# Patient Record
Sex: FEMALE | Race: ASIAN | NOT HISPANIC OR LATINO
[De-identification: names, ages, dates, MRNs, and addresses within clinical notes are randomized per-mention and may not be internally consistent; named-entity substitution may affect disease eponyms.]

---

## 2017-01-31 ENCOUNTER — APPOINTMENT (OUTPATIENT)
Dept: OPHTHALMOLOGY | Facility: CLINIC | Age: 67
End: 2017-01-31

## 2017-06-10 ENCOUNTER — INPATIENT (INPATIENT)
Facility: HOSPITAL | Age: 67
LOS: 5 days | Discharge: HOME | DRG: 312 | End: 2017-06-16
Attending: INTERNAL MEDICINE | Admitting: INTERNAL MEDICINE
Payer: COMMERCIAL

## 2017-06-10 VITALS
RESPIRATION RATE: 18 BRPM | DIASTOLIC BLOOD PRESSURE: 61 MMHG | OXYGEN SATURATION: 98 % | SYSTOLIC BLOOD PRESSURE: 97 MMHG | HEART RATE: 79 BPM | TEMPERATURE: 97 F

## 2017-06-10 DIAGNOSIS — R55 SYNCOPE AND COLLAPSE: ICD-10-CM

## 2017-06-10 DIAGNOSIS — I10 ESSENTIAL (PRIMARY) HYPERTENSION: ICD-10-CM

## 2017-06-10 DIAGNOSIS — E78.00 PURE HYPERCHOLESTEROLEMIA, UNSPECIFIED: ICD-10-CM

## 2017-06-10 DIAGNOSIS — I25.10 ATHEROSCLEROTIC HEART DISEASE OF NATIVE CORONARY ARTERY WITHOUT ANGINA PECTORIS: ICD-10-CM

## 2017-06-10 LAB
ALBUMIN SERPL ELPH-MCNC: 3.8 G/DL — SIGNIFICANT CHANGE UP (ref 3.3–5)
ALP SERPL-CCNC: 43 U/L — SIGNIFICANT CHANGE UP (ref 40–120)
ALT FLD-CCNC: 14 U/L RC — SIGNIFICANT CHANGE UP (ref 10–45)
ANION GAP SERPL CALC-SCNC: 14 MMOL/L — SIGNIFICANT CHANGE UP (ref 5–17)
APPEARANCE UR: CLEAR — SIGNIFICANT CHANGE UP
APTT BLD: 27.4 SEC — LOW (ref 27.5–37.4)
AST SERPL-CCNC: 15 U/L — SIGNIFICANT CHANGE UP (ref 10–40)
BASOPHILS # BLD AUTO: 0 K/UL — SIGNIFICANT CHANGE UP (ref 0–0.2)
BASOPHILS NFR BLD AUTO: 0.2 % — SIGNIFICANT CHANGE UP (ref 0–2)
BILIRUB SERPL-MCNC: 0.2 MG/DL — SIGNIFICANT CHANGE UP (ref 0.2–1.2)
BILIRUB UR-MCNC: NEGATIVE — SIGNIFICANT CHANGE UP
BLD GP AB SCN SERPL QL: NEGATIVE — SIGNIFICANT CHANGE UP
BUN SERPL-MCNC: 51 MG/DL — HIGH (ref 7–23)
CALCIUM SERPL-MCNC: 8.7 MG/DL — SIGNIFICANT CHANGE UP (ref 8.4–10.5)
CHLORIDE SERPL-SCNC: 107 MMOL/L — SIGNIFICANT CHANGE UP (ref 96–108)
CK MB CFR SERPL CALC: 1.1 NG/ML — SIGNIFICANT CHANGE UP (ref 0–3.8)
CK SERPL-CCNC: 56 U/L — SIGNIFICANT CHANGE UP (ref 25–170)
CO2 SERPL-SCNC: 22 MMOL/L — SIGNIFICANT CHANGE UP (ref 22–31)
COLOR SPEC: SIGNIFICANT CHANGE UP
CREAT SERPL-MCNC: 1.08 MG/DL — SIGNIFICANT CHANGE UP (ref 0.5–1.3)
D DIMER BLD IA.RAPID-MCNC: 308 NG/ML DDU — HIGH
DIFF PNL FLD: NEGATIVE — SIGNIFICANT CHANGE UP
EOSINOPHIL # BLD AUTO: 0.1 K/UL — SIGNIFICANT CHANGE UP (ref 0–0.5)
EOSINOPHIL NFR BLD AUTO: 0.9 % — SIGNIFICANT CHANGE UP (ref 0–6)
EPI CELLS # UR: SIGNIFICANT CHANGE UP /HPF
GLUCOSE SERPL-MCNC: 95 MG/DL — SIGNIFICANT CHANGE UP (ref 70–99)
GLUCOSE UR QL: NEGATIVE — SIGNIFICANT CHANGE UP
HCT VFR BLD CALC: 30.4 % — LOW (ref 34.5–45)
HGB BLD-MCNC: 10.2 G/DL — LOW (ref 11.5–15.5)
INR BLD: 1.1 RATIO — SIGNIFICANT CHANGE UP (ref 0.88–1.16)
KETONES UR-MCNC: NEGATIVE — SIGNIFICANT CHANGE UP
LEUKOCYTE ESTERASE UR-ACNC: NEGATIVE — SIGNIFICANT CHANGE UP
LIDOCAIN IGE QN: 88 U/L — HIGH (ref 7–60)
LYMPHOCYTES # BLD AUTO: 1.5 K/UL — SIGNIFICANT CHANGE UP (ref 1–3.3)
LYMPHOCYTES # BLD AUTO: 14.6 % — SIGNIFICANT CHANGE UP (ref 13–44)
MCHC RBC-ENTMCNC: 33.4 PG — SIGNIFICANT CHANGE UP (ref 27–34)
MCHC RBC-ENTMCNC: 33.6 GM/DL — SIGNIFICANT CHANGE UP (ref 32–36)
MCV RBC AUTO: 99.4 FL — SIGNIFICANT CHANGE UP (ref 80–100)
MONOCYTES # BLD AUTO: 0.8 K/UL — SIGNIFICANT CHANGE UP (ref 0–0.9)
MONOCYTES NFR BLD AUTO: 8 % — SIGNIFICANT CHANGE UP (ref 2–14)
NEUTROPHILS # BLD AUTO: 7.6 K/UL — HIGH (ref 1.8–7.4)
NEUTROPHILS NFR BLD AUTO: 76.3 % — SIGNIFICANT CHANGE UP (ref 43–77)
NITRITE UR-MCNC: NEGATIVE — SIGNIFICANT CHANGE UP
NT-PROBNP SERPL-SCNC: 127 PG/ML — SIGNIFICANT CHANGE UP (ref 0–300)
PH UR: 6 — SIGNIFICANT CHANGE UP (ref 5–8)
PLATELET # BLD AUTO: 200 K/UL — SIGNIFICANT CHANGE UP (ref 150–400)
POTASSIUM SERPL-MCNC: 4.4 MMOL/L — SIGNIFICANT CHANGE UP (ref 3.5–5.3)
POTASSIUM SERPL-SCNC: 4.4 MMOL/L — SIGNIFICANT CHANGE UP (ref 3.5–5.3)
PROT SERPL-MCNC: 6.3 G/DL — SIGNIFICANT CHANGE UP (ref 6–8.3)
PROT UR-MCNC: NEGATIVE — SIGNIFICANT CHANGE UP
PROTHROM AB SERPL-ACNC: 12 SEC — SIGNIFICANT CHANGE UP (ref 9.8–12.7)
RBC # BLD: 3.06 M/UL — LOW (ref 3.8–5.2)
RBC # FLD: 12.5 % — SIGNIFICANT CHANGE UP (ref 10.3–14.5)
RBC CASTS # UR COMP ASSIST: SIGNIFICANT CHANGE UP /HPF (ref 0–2)
RH IG SCN BLD-IMP: POSITIVE — SIGNIFICANT CHANGE UP
SODIUM SERPL-SCNC: 143 MMOL/L — SIGNIFICANT CHANGE UP (ref 135–145)
SP GR SPEC: >1.03 — HIGH (ref 1.01–1.02)
TROPONIN T SERPL-MCNC: <0.01 NG/ML — SIGNIFICANT CHANGE UP (ref 0–0.06)
TSH SERPL-MCNC: 0.57 UIU/ML — SIGNIFICANT CHANGE UP (ref 0.27–4.2)
UROBILINOGEN FLD QL: NEGATIVE — SIGNIFICANT CHANGE UP
WBC # BLD: 10 K/UL — SIGNIFICANT CHANGE UP (ref 3.8–10.5)
WBC # FLD AUTO: 10 K/UL — SIGNIFICANT CHANGE UP (ref 3.8–10.5)
WBC UR QL: SIGNIFICANT CHANGE UP /HPF (ref 0–5)

## 2017-06-10 PROCEDURE — 70450 CT HEAD/BRAIN W/O DYE: CPT | Mod: 26

## 2017-06-10 PROCEDURE — 71275 CT ANGIOGRAPHY CHEST: CPT | Mod: 26

## 2017-06-10 PROCEDURE — 71010: CPT | Mod: 26

## 2017-06-10 PROCEDURE — 93010 ELECTROCARDIOGRAM REPORT: CPT

## 2017-06-10 PROCEDURE — 99285 EMERGENCY DEPT VISIT HI MDM: CPT | Mod: 25

## 2017-06-10 RX ORDER — PANTOPRAZOLE SODIUM 20 MG/1
40 TABLET, DELAYED RELEASE ORAL
Qty: 0 | Refills: 0 | Status: DISCONTINUED | OUTPATIENT
Start: 2017-06-10 | End: 2017-06-14

## 2017-06-10 RX ORDER — CHOLECALCIFEROL (VITAMIN D3) 125 MCG
1 CAPSULE ORAL
Qty: 0 | Refills: 0 | COMMUNITY

## 2017-06-10 RX ORDER — SODIUM CHLORIDE 9 MG/ML
1000 INJECTION INTRAMUSCULAR; INTRAVENOUS; SUBCUTANEOUS ONCE
Qty: 0 | Refills: 0 | Status: COMPLETED | OUTPATIENT
Start: 2017-06-10 | End: 2017-06-10

## 2017-06-10 RX ORDER — HEPARIN SODIUM 5000 [USP'U]/ML
5000 INJECTION INTRAVENOUS; SUBCUTANEOUS EVERY 12 HOURS
Qty: 0 | Refills: 0 | Status: DISCONTINUED | OUTPATIENT
Start: 2017-06-10 | End: 2017-06-13

## 2017-06-10 RX ORDER — ACETAMINOPHEN 500 MG
1000 TABLET ORAL ONCE
Qty: 0 | Refills: 0 | Status: COMPLETED | OUTPATIENT
Start: 2017-06-10 | End: 2017-06-10

## 2017-06-10 RX ORDER — BUPROPION HYDROCHLORIDE 150 MG/1
200 TABLET, EXTENDED RELEASE ORAL DAILY
Qty: 0 | Refills: 0 | Status: DISCONTINUED | OUTPATIENT
Start: 2017-06-10 | End: 2017-06-16

## 2017-06-10 RX ORDER — RANOLAZINE 500 MG/1
500 TABLET, FILM COATED, EXTENDED RELEASE ORAL
Qty: 0 | Refills: 0 | Status: DISCONTINUED | OUTPATIENT
Start: 2017-06-10 | End: 2017-06-16

## 2017-06-10 RX ORDER — ISOSORBIDE MONONITRATE 60 MG/1
60 TABLET, EXTENDED RELEASE ORAL DAILY
Qty: 0 | Refills: 0 | Status: DISCONTINUED | OUTPATIENT
Start: 2017-06-10 | End: 2017-06-16

## 2017-06-10 RX ORDER — CLONAZEPAM 1 MG
0.5 TABLET ORAL
Qty: 0 | Refills: 0 | Status: DISCONTINUED | OUTPATIENT
Start: 2017-06-10 | End: 2017-06-16

## 2017-06-10 RX ORDER — PANTOPRAZOLE SODIUM 20 MG/1
80 TABLET, DELAYED RELEASE ORAL ONCE
Qty: 0 | Refills: 0 | Status: COMPLETED | OUTPATIENT
Start: 2017-06-10 | End: 2017-06-10

## 2017-06-10 RX ORDER — CHOLECALCIFEROL (VITAMIN D3) 125 MCG
1000 CAPSULE ORAL DAILY
Qty: 0 | Refills: 0 | Status: DISCONTINUED | OUTPATIENT
Start: 2017-06-10 | End: 2017-06-16

## 2017-06-10 RX ORDER — DILTIAZEM HCL 120 MG
180 CAPSULE, EXT RELEASE 24 HR ORAL DAILY
Qty: 0 | Refills: 0 | Status: DISCONTINUED | OUTPATIENT
Start: 2017-06-10 | End: 2017-06-16

## 2017-06-10 RX ORDER — BUPROPION HYDROCHLORIDE 150 MG/1
200 TABLET, EXTENDED RELEASE ORAL DAILY
Qty: 0 | Refills: 0 | Status: DISCONTINUED | OUTPATIENT
Start: 2017-06-10 | End: 2017-06-10

## 2017-06-10 RX ORDER — ASPIRIN/CALCIUM CARB/MAGNESIUM 324 MG
1 TABLET ORAL
Qty: 0 | Refills: 0 | COMMUNITY

## 2017-06-10 RX ORDER — ONDANSETRON 8 MG/1
4 TABLET, FILM COATED ORAL ONCE
Qty: 0 | Refills: 0 | Status: COMPLETED | OUTPATIENT
Start: 2017-06-10 | End: 2017-06-10

## 2017-06-10 RX ORDER — SODIUM CHLORIDE 9 MG/ML
2000 INJECTION INTRAMUSCULAR; INTRAVENOUS; SUBCUTANEOUS
Qty: 0 | Refills: 0 | Status: DISCONTINUED | OUTPATIENT
Start: 2017-06-10 | End: 2017-06-12

## 2017-06-10 RX ORDER — ASPIRIN/CALCIUM CARB/MAGNESIUM 324 MG
81 TABLET ORAL DAILY
Qty: 0 | Refills: 0 | Status: DISCONTINUED | OUTPATIENT
Start: 2017-06-10 | End: 2017-06-13

## 2017-06-10 RX ADMIN — SODIUM CHLORIDE 4000 MILLILITER(S): 9 INJECTION INTRAMUSCULAR; INTRAVENOUS; SUBCUTANEOUS at 12:57

## 2017-06-10 RX ADMIN — PANTOPRAZOLE SODIUM 80 MILLIGRAM(S): 20 TABLET, DELAYED RELEASE ORAL at 14:48

## 2017-06-10 RX ADMIN — ONDANSETRON 4 MILLIGRAM(S): 8 TABLET, FILM COATED ORAL at 12:57

## 2017-06-10 RX ADMIN — Medication 0.5 MILLIGRAM(S): at 18:29

## 2017-06-10 RX ADMIN — HEPARIN SODIUM 5000 UNIT(S): 5000 INJECTION INTRAVENOUS; SUBCUTANEOUS at 18:29

## 2017-06-10 RX ADMIN — Medication 400 MILLIGRAM(S): at 13:10

## 2017-06-10 RX ADMIN — RANOLAZINE 500 MILLIGRAM(S): 500 TABLET, FILM COATED, EXTENDED RELEASE ORAL at 18:29

## 2017-06-10 RX ADMIN — SODIUM CHLORIDE 4000 MILLILITER(S): 9 INJECTION INTRAMUSCULAR; INTRAVENOUS; SUBCUTANEOUS at 14:10

## 2017-06-10 RX ADMIN — Medication 30 MILLIGRAM(S): at 23:25

## 2017-06-10 RX ADMIN — SODIUM CHLORIDE 75 MILLILITER(S): 9 INJECTION INTRAMUSCULAR; INTRAVENOUS; SUBCUTANEOUS at 18:39

## 2017-06-10 NOTE — ED PROVIDER NOTE - GASTROINTESTINAL, MLM
Abdomen soft, non-tender, no guarding. Abdomen soft, non-tender, no guarding.  EMA: brown stool heme occult negative

## 2017-06-10 NOTE — H&P ADULT - FAMILY HISTORY
Father  Still living? Unknown  Family history of early CAD, Age at diagnosis: Age Unknown     Aunt  Still living? Unknown  Family history of diabetes mellitus (DM), Age at diagnosis: Age Unknown

## 2017-06-10 NOTE — ED PROVIDER NOTE - PROGRESS NOTE DETAILS
Spoke with Dr. Howie Kennedy is fine with any hospitalist for admission. Spoke with Dr. Howie Kennedy is fine with any hospitalist for admission. Pt still feels light headed, worse when she stands up, vs stable.  on rectal brown stool heme occult negative, cta negative for PE. WIll admit for serial CBC's and GI consult.

## 2017-06-10 NOTE — ED PROVIDER NOTE - PHYSICAL EXAMINATION
Kim:  General: No distress.  Mentation at baseline.   HEENT: WNL  Chest/Lungs: CTAB, No wheeze, No retractions, No increased work of breathing, Normal rate  Heart: S1S2 RRR, No M/R/G, Pules equal Bilaterally in upper and lower extremities distally  Abd: soft, NT/ND, No guarding, No rebound.  No hernias, no palpable masses.  Extrem: FROM in all joints, no significant edema noted, No ulcers.  Cap refil < 2sec.  Skin: No rash noted, warm dry.  Neuro:  Grossly normal.  No difficulty ambulating. No focal deficits.  Psychiatric: No evidence of delusions. No SI/HI.

## 2017-06-10 NOTE — ED PROVIDER NOTE - MEDICAL DECISION MAKING DETAILS
Non prodromal syncope in the setting of hypotension (systolic 90's). Will check ekg, cbc, trop, cxr, sepsis w/u, fluid hydration, d-dimer, orthostatics, ct head, and re-assess.

## 2017-06-10 NOTE — H&P ADULT - NSHPREVIEWOFSYSTEMS_GEN_ALL_CORE
Gen: no loss of wt or appetite  ENT: no dizziness or hearing loss  Ophth: no blurring of vision or loss of vision  Resp: No cough or sputum production  CVS: No CP or palpitations or orthopnea  GI: + mild nausea no /V/D  : no dysuria, hematuria  Endo: no polyuria or excessive sweating  Neuro: no weakness or paresthesias  Psych: No suicidal or depressive ideation  Heme: No petechiae or easy bruising  Msk: No joint pain or swelling  All other ROS negative

## 2017-06-10 NOTE — ED ADULT NURSE NOTE - OBJECTIVE STATEMENT
68 y/o F with CA, HTN, presents with 1 episode of syncope.  Patient had eaten breakfast and suddenly found herself on floor, uncertain if she hit her head. notes she has had 2 days of right occipital headache, similar to her usual headache. + nausea, no abdominal pain, no chest pain, no palpitations, no recent strenuous exercise, no new medications, no shortness of breath, no leg pain, no recent travel, no pleuritic chest pain, no blood in stool, no fever, no urinary complaints.

## 2017-06-10 NOTE — ED PROVIDER NOTE - OBJECTIVE STATEMENT
68 y/o F with CA, HTN, presents with 1 episode of syncope.  Patient had eaten breakfast and suddenly found herself on floor, uncertain if she hit her head. notes she has had 2 days of right occipital headache, similar to her usual headache. + nausea, no abdominal pain, no chest pain, no palpiations, no recent strenuous exercise, no new medications, no shortness of breath, no leg pain, no recent travel, no pleuritic chest pain, no blood in stool, no fever, no urinary complaints. 68 y/o F with CA, HTN, presents with 1 episode of syncope.  Patient had eaten breakfast and suddenly found herself on floor, uncertain if she hit her head. notes she has had 2 days of right occipital headache, similar to her usual headache. + nausea, no abdominal pain, no chest pain, no palpiations, no recent strenuous exercise, no new medications, no shortness of breath, no leg pain, no recent travel, no pleuritic chest pain, no blood in stool, no fever, no urinary complaints.    Kim:  Syncope today with headache (typical). No fever no other complaints 66 y/o F with CA, HTN, presents with 1 episode of syncope.  Patient had eaten breakfast and suddenly found herself on floor, uncertain if she hit her head. notes she has had 2 days of right occipital headache, similar to her usual headache. + nausea, no abdominal pain, no chest pain, no palpiations, no recent strenuous exercise, no new medications, no shortness of breath, no leg pain, no recent travel, no pleuritic chest pain, no blood in stool, no fever, no urinary complaints.    Kim:  Syncope today with headache (typical). No fever no other complaints    PMD: christiano wilburn

## 2017-06-10 NOTE — H&P ADULT - PROBLEM SELECTOR PLAN 2
last hemoglobin was 13 in 2015  now it is 10 with a BUN of 51 and a nl creatinine  Suspect will drop with hydration  GI Dr Fernandez to see  recent EGD/Colon with Dr Nice showed gastric ulcer  pt states was due for another EGD  Will try to schedule in house  Monitor H/H and check iron/folate/b12 levels

## 2017-06-10 NOTE — H&P ADULT - ASSESSMENT
66 y/o F with CAD, HTN was in her usual state of health when she was called by her daughter for breakfast after which she doesn’t remember anything, likely secondary to true syncope

## 2017-06-10 NOTE — ED PROVIDER NOTE - ATTENDING CONTRIBUTION TO CARE
Julio:  I have independently evaluated the patient and have documented in the appropriate sections above.  I agree with the exam and plan as noted above.

## 2017-06-10 NOTE — H&P ADULT - HISTORY OF PRESENT ILLNESS
68 y/o F with CAD, HTN was in her usual state of health when she was called by her daughter for breakfast after which she doesn’t remember anything. She states daughter found her on the floor and apparently she did not hurt herself. She denies any incontinence or seizure like activity, no chest pain or aura preceding. She does note 2 days of right occipital headache, similar to her usual headache.  Had mild nausea but no abdominal pain, no palpitations, no recent strenuous exercise, no new medications, no shortness of breath, no leg pain, no recent travel, no pleuritic chest pain, no blood in stool, no fever, no urinary complaints. Currently feels back to NL

## 2017-06-11 DIAGNOSIS — R55 SYNCOPE AND COLLAPSE: ICD-10-CM

## 2017-06-11 DIAGNOSIS — E53.8 DEFICIENCY OF OTHER SPECIFIED B GROUP VITAMINS: ICD-10-CM

## 2017-06-11 DIAGNOSIS — D64.9 ANEMIA, UNSPECIFIED: ICD-10-CM

## 2017-06-11 LAB
ANION GAP SERPL CALC-SCNC: 9 MMOL/L — SIGNIFICANT CHANGE UP (ref 5–17)
BUN SERPL-MCNC: 33 MG/DL — HIGH (ref 7–23)
CALCIUM SERPL-MCNC: 8.1 MG/DL — LOW (ref 8.4–10.5)
CHLORIDE SERPL-SCNC: 111 MMOL/L — HIGH (ref 96–108)
CK MB CFR SERPL CALC: 1 NG/ML — SIGNIFICANT CHANGE UP (ref 0–3.8)
CK MB CFR SERPL CALC: 1 NG/ML — SIGNIFICANT CHANGE UP (ref 0–3.8)
CK SERPL-CCNC: 44 U/L — SIGNIFICANT CHANGE UP (ref 25–170)
CK SERPL-CCNC: 46 U/L — SIGNIFICANT CHANGE UP (ref 25–170)
CO2 SERPL-SCNC: 23 MMOL/L — SIGNIFICANT CHANGE UP (ref 22–31)
CREAT SERPL-MCNC: 1.05 MG/DL — SIGNIFICANT CHANGE UP (ref 0.5–1.3)
FERRITIN SERPL-MCNC: 34 NG/ML — SIGNIFICANT CHANGE UP (ref 15–150)
GLUCOSE SERPL-MCNC: 98 MG/DL — SIGNIFICANT CHANGE UP (ref 70–99)
HCT VFR BLD CALC: 24.7 % — LOW (ref 34.5–45)
HCT VFR BLD CALC: 24.9 % — LOW (ref 34.5–45)
HCV AB S/CO SERPL IA: 0.16 S/CO — SIGNIFICANT CHANGE UP
HCV AB SERPL-IMP: SIGNIFICANT CHANGE UP
HGB BLD-MCNC: 8.1 G/DL — LOW (ref 11.5–15.5)
HGB BLD-MCNC: 8.4 G/DL — LOW (ref 11.5–15.5)
IRON SATN MFR SERPL: 101 UG/DL — SIGNIFICANT CHANGE UP (ref 30–160)
IRON SATN MFR SERPL: 40 % — SIGNIFICANT CHANGE UP (ref 14–50)
MCHC RBC-ENTMCNC: 32.6 PG — SIGNIFICANT CHANGE UP (ref 27–34)
MCHC RBC-ENTMCNC: 32.7 GM/DL — SIGNIFICANT CHANGE UP (ref 32–36)
MCHC RBC-ENTMCNC: 34 PG — SIGNIFICANT CHANGE UP (ref 27–34)
MCHC RBC-ENTMCNC: 34.2 GM/DL — SIGNIFICANT CHANGE UP (ref 32–36)
MCV RBC AUTO: 99.4 FL — SIGNIFICANT CHANGE UP (ref 80–100)
MCV RBC AUTO: 99.8 FL — SIGNIFICANT CHANGE UP (ref 80–100)
OB PNL STL: POSITIVE
PLATELET # BLD AUTO: 172 K/UL — SIGNIFICANT CHANGE UP (ref 150–400)
PLATELET # BLD AUTO: 185 K/UL — SIGNIFICANT CHANGE UP (ref 150–400)
POTASSIUM SERPL-MCNC: 5.1 MMOL/L — SIGNIFICANT CHANGE UP (ref 3.5–5.3)
POTASSIUM SERPL-SCNC: 5.1 MMOL/L — SIGNIFICANT CHANGE UP (ref 3.5–5.3)
RBC # BLD: 2.48 M/UL — LOW (ref 3.8–5.2)
RBC # BLD: 2.49 M/UL — LOW (ref 3.8–5.2)
RBC # FLD: 12.4 % — SIGNIFICANT CHANGE UP (ref 10.3–14.5)
RBC # FLD: 12.5 % — SIGNIFICANT CHANGE UP (ref 10.3–14.5)
SODIUM SERPL-SCNC: 143 MMOL/L — SIGNIFICANT CHANGE UP (ref 135–145)
TIBC SERPL-MCNC: 255 UG/DL — SIGNIFICANT CHANGE UP (ref 220–430)
TROPONIN T SERPL-MCNC: <0.01 NG/ML — SIGNIFICANT CHANGE UP (ref 0–0.06)
TROPONIN T SERPL-MCNC: <0.01 NG/ML — SIGNIFICANT CHANGE UP (ref 0–0.06)
UIBC SERPL-MCNC: 154 UG/DL — SIGNIFICANT CHANGE UP (ref 110–370)
VIT B12 SERPL-MCNC: 201 PG/ML — LOW (ref 243–894)
WBC # BLD: 5.4 K/UL — SIGNIFICANT CHANGE UP (ref 3.8–10.5)
WBC # BLD: 6 K/UL — SIGNIFICANT CHANGE UP (ref 3.8–10.5)
WBC # FLD AUTO: 5.4 K/UL — SIGNIFICANT CHANGE UP (ref 3.8–10.5)
WBC # FLD AUTO: 6 K/UL — SIGNIFICANT CHANGE UP (ref 3.8–10.5)

## 2017-06-11 PROCEDURE — 93010 ELECTROCARDIOGRAM REPORT: CPT

## 2017-06-11 RX ADMIN — PANTOPRAZOLE SODIUM 40 MILLIGRAM(S): 20 TABLET, DELAYED RELEASE ORAL at 07:03

## 2017-06-11 RX ADMIN — RANOLAZINE 500 MILLIGRAM(S): 500 TABLET, FILM COATED, EXTENDED RELEASE ORAL at 05:45

## 2017-06-11 RX ADMIN — HEPARIN SODIUM 5000 UNIT(S): 5000 INJECTION INTRAVENOUS; SUBCUTANEOUS at 05:46

## 2017-06-11 RX ADMIN — RANOLAZINE 500 MILLIGRAM(S): 500 TABLET, FILM COATED, EXTENDED RELEASE ORAL at 17:17

## 2017-06-11 RX ADMIN — SODIUM CHLORIDE 75 MILLILITER(S): 9 INJECTION INTRAMUSCULAR; INTRAVENOUS; SUBCUTANEOUS at 08:30

## 2017-06-11 RX ADMIN — Medication 180 MILLIGRAM(S): at 05:46

## 2017-06-11 RX ADMIN — Medication 81 MILLIGRAM(S): at 12:48

## 2017-06-11 RX ADMIN — Medication 0.5 MILLIGRAM(S): at 05:46

## 2017-06-11 RX ADMIN — Medication 30 MILLIGRAM(S): at 21:03

## 2017-06-11 RX ADMIN — HEPARIN SODIUM 5000 UNIT(S): 5000 INJECTION INTRAVENOUS; SUBCUTANEOUS at 17:17

## 2017-06-11 RX ADMIN — BUPROPION HYDROCHLORIDE 200 MILLIGRAM(S): 150 TABLET, EXTENDED RELEASE ORAL at 12:48

## 2017-06-11 RX ADMIN — Medication 0.5 MILLIGRAM(S): at 17:18

## 2017-06-11 RX ADMIN — Medication 1000 UNIT(S): at 12:48

## 2017-06-11 NOTE — CONSULT NOTE ADULT - ATTENDING COMMENTS
CARDIOLOGY ATTENDING    Agree with above. Admitted with syncope. EKG normal. Recommend echo r/o structural heart disease. If echo normal recommend outpatient event monitor. F/u with [facility] cardiologist Luke Augustine after discharge.

## 2017-06-11 NOTE — CONSULT NOTE ADULT - PROBLEM SELECTOR RECOMMENDATION 9
- Cardiology consult pending  - ? Echocardiogram per cards / trend cardiac enzymes   - Management per primary

## 2017-06-11 NOTE — CONSULT NOTE ADULT - SUBJECTIVE AND OBJECTIVE BOX
Chief Complaint:  Patient is a 67y old  Female who presents with a chief complaint of anemia/ Hx of occult positive anemia     HPI:  68 y/o F with CAD, HTN was in her usual state of health when she was called by her daughter for breakfast after which she doesn’t remember anything. She states daughter found her on the floor and apparently she did not hurt herself. She denies any incontinence or seizure like activity, no chest pain or aura preceding. She does note 2 days of right occipital headache, similar to her usual headache.  Had mild nausea but no abdominal pain, no palpitations, no recent strenuous exercise, no new medications, no shortness of breath, no leg pain, no recent travel, no pleuritic chest pain, no blood in stool, no fever, no urinary complaints. Currently feels back to NL (10 Jonny 2017 17:24)    Patient now being seen for decreasing h/h, w/ a history of occult positive anemia (about three to four years ago.) She endorses a history of gastric ulcers (which were found on recent egd within the past year w/ Dr. Nice, she has had a colonoscopy w/in the last year as well; negative). No current melena or hematochezia, no abdominal complaints at present. Tolerating a regular diet. Uses aspirin daily at home.       PAST MEDICAL & SURGICAL HISTORY:  CAD (coronary artery disease)  Hypercholesterolemia  Hypertension  No significant past surgical history      Previous Diagnostic Testing:    EGD: W/in the past year w/ Dr. Nice -- Gastric Ulcers     Colonoscopy: W/in the past year w. Dr. Nice -- negative     Home Medications:   aspirin enteric coated 81milliGRAM(s) Oral daily  buPROPion SR 200milliGRAM(s) Oral daily  isosorbide   mononitrate ER Tablet (IMDUR) 60milliGRAM(s) Oral daily  ranolazine 500milliGRAM(s) Oral two times a day  diltiazem   CD 180milliGRAM(s) Oral daily  clonazePAM Tablet 0.5milliGRAM(s) Oral two times a day  PARoxetine 30milliGRAM(s) Oral daily  cholecalciferol 1000Unit(s) Oral daily  sodium chloride 0.9%. 2000milliLiter(s) IV Continuous <Continuous>  heparin  Injectable 5000Unit(s) SubCutaneous every 12 hours  pantoprazole    Tablet 40milliGRAM(s) Oral before breakfast        FAMILY HISTORY:  Family history of diabetes mellitus (DM) (Aunt)  Family history of early CAD (Father)      Social History: Marital Status: Unknown Lives With: Unknown Substance Abuse: None Smoker: Unknown     Allergies    latex (Rash)  No Known Drug Allergies  shellfish (Hives)    Review of Systems:    General:  No wt loss, fevers, chills, night sweats,fatigue,   Eyes:  Good vision, no reported pain  ENT:  No sore throat, pain, runny nose, dysphagia  CV:  No pain, palpitatioins, hypo/hypertension  Resp:  No dyspnea, cough, tachypnea, wheezing  :  No pain, bleeding, incontinence, nocturia  Muscle:  No pain, weakness  Neuro:  No weakness, tingling, memory problems  Psych:  No fatigue, insomnia, mood problems, depression  Endocrine:  No polyuria, polydypsia, cold/heat intolerance  Heme:  No petechiae, ecchymosis, easy bruisability  Skin:  No rash, tattoos, scars, edema    Physical Exam:    Vital Signs:  Vital Signs Last 24 Hrs  T(C): 36.6, Max: 36.9 (06-10 @ 16:59)  T(F): 97.9, Max: 98.4 (06-10 @ 16:59)  HR: 74 (70 - 79)  BP: 101/63 (97/61 - 121/66)  BP(mean): --  RR: 18 (18 - 18)  SpO2: 97% (97% - 100%)  Daily Height in cm: 149.86 (10 Jonny 2017 18:10)    Daily Weight in k.2 (2017 07:13)    General:  Appears stated age, well-groomed, well-nourished, no distress  HEENT:  NC/AT,  conjunctivae clear and pink, no thyromegaly, nodules, adenopathy, no JVD  Chest:  Full & symmetric excursion, no increased effort, breath sounds clear  Cardiovascular:  Regular rhythm, S1, S2, no murmur/rub/S3/S4, no abdominal bruit, no edema  Abdomen:  Soft, non-tender, non-distended, normoactive bowel sounds,  no masses ,no hepatosplenomeagaly, no signs of chronic liver disease  Extremities:  no cyanosis,clubbing or edema  Skin:  No rash/erythema/ecchymoses/petechiae/wounds/abscess/warm/dry  Neuro/Psych:  Alert, oriented, no asterixis, no tremor, no encephalopathy      Imaging: -----      Laboratory:      143  |  111<H>  |  33<H>  ----------------------------<  98  5.1   |  23  |  1.05    Ca    8.1<L>      2017 04:02    TPro  6.3  /  Alb  3.8  /  TBili  0.2  /  DBili  x   /  AST  15  /  ALT  14  /  AlkPhos  43  06-10                          8.1    5.4   )-----------( 172      ( 2017 08:26 )             24.9         LIVER FUNCTIONS - ( 10 Jonny 2017 12:51 )  Alb: 3.8 g/dL / Pro: 6.3 g/dL / ALK PHOS: 43 U/L / ALT: 14 U/L RC / AST: 15 U/L / GGT: x           PT/INR - ( 10 Jonny 2017 12:51 )   PT: 12.0 sec;   INR: 1.10 ratio         PTT - ( 10 Jonny 2017 12:51 )  PTT:27.4 sec  Urinalysis Basic - ( 10 Jonny 2017 15:20 )    Color: PL Yellow / Appearance: Clear / SG: >1.030 / pH: x  Gluc: x / Ketone: Negative  / Bili: Negative / Urobili: Negative   Blood: x / Protein: Negative / Nitrite: Negative   Leuk Esterase: Negative / RBC: 0-2 /HPF / WBC 0-2 /HPF   Sq Epi: x / Non Sq Epi: OCC /HPF / Bacteria: x      Amylase Serum--      Lipase serum88       Ammonia--

## 2017-06-11 NOTE — CONSULT NOTE ADULT - ASSESSMENT
68 y/o F with CAD, HTN, hx of gastric ulcers and occult positive anemia, admitted for syncope workup; now being evaluated for decreasing h/h in the absence of melena or hematochezia

## 2017-06-11 NOTE — CONSULT NOTE ADULT - SUBJECTIVE AND OBJECTIVE BOX
HISTORY OF PRESENT ILLNESS:    68 y/o F with CAD, HTN, HLD  denies any history of arrhythmias CHF or MI  Farther had MI at age 66   P/W syncopal episode unwitnessed  found by family member.  She reports she does not remember event did not experience any lightheadedness or aura preceding event  & denies any head trauma. She denies any incontinence or seizure like activity, no chest pain or aura preceding. She does note 2 days of right occipital headache, similar to her usual headache.  Had mild nausea but no abdominal pain, no palpitations, no recent strenuous exercise, no new medications, no shortness of breath, no leg pain, no recent travel, no pleuritic chest pain, no blood in stool, no fever, no urinary complaints.   Reports she had stress test for per op many years ago denies hx CATH   Cardiologist is Dr Augustine at [facility]      PAST MEDICAL & SURGICAL HISTORY:  CAD (coronary artery disease)  Hypercholesterolemia  Hypertension  Sx hx includes c spine fusion C3-4     	    MEDICATIONS:  aspirin enteric coated 81milliGRAM(s) Oral daily  isosorbide   mononitrate ER Tablet (IMDUR) 60milliGRAM(s) Oral daily  ranolazine 500milliGRAM(s) Oral two times a day  diltiazem   CD 180milliGRAM(s) Oral daily  heparin  Injectable 5000Unit(s) SubCutaneous every 12 hours        buPROPion SR 200milliGRAM(s) Oral daily  clonazePAM Tablet 0.5milliGRAM(s) Oral two times a day  PARoxetine 30milliGRAM(s) Oral daily    pantoprazole    Tablet 40milliGRAM(s) Oral before breakfast      cholecalciferol 1000Unit(s) Oral daily  sodium chloride 0.9%. 2000milliLiter(s) IV Continuous <Continuous>      Allergies    latex (Rash)  No Known Drug Allergies  shellfish (Hives)    Intolerances        FAMILY HISTORY:  Family history of diabetes mellitus (DM) (Aunt)  Family history of early CAD (Father)      SOCIAL HISTORY:    [x ] Non-smoker  [ ] Smoker  [ ] Alcohol none      REVIEW OF SYSTEMS:        CONSTITUTIONAL: No fever, weight loss, or fatigue  EYES: No eye pain, visual disturbances, or discharge  ENMT:  No difficulty hearing, tinnitus, vertigo; No sinus or throat pain  NECK: No pain or stiffness  RESPIRATORY: No cough, wheezing, chills or hemoptysis; No Shortness of Breath  CARDIOVASCULAR: No chest pain, palpitations,  + passing out PTA  dizziness, or leg swelling  GASTROINTESTINAL: No abdominal or epigastric pain. +  nausea PTA, vomiting, or hematemesis; No diarrhea or constipation. No melena or hematochezia.  GENITOURINARY: No dysuria, frequency, hematuria, or incontinence  NEUROLOGICAL: + headaches [2 days of right occipital headache, similar to her usual headache] PTA  memory loss, loss of strength, numbness, or tremors  SKIN: No itching, burning, rashes, or lesions   LYMPH Nodes: No enlarged glands  ENDOCRINE: No heat or cold intolerance; No hair loss  MUSCULOSKELETAL: No joint pain or swelling; No muscle, back, or extremity pain  PSYCHIATRIC: No depression, anxiety, mood swings, or difficulty sleeping  HEME/LYMPH: No easy bruising, or bleeding gums  ALLERY AND IMMUNOLOGIC: No hives or eczema	    [ ] All others negative	  [ ] Unable to obtain    PHYSICAL EXAM:  T(C): 36.6, Max: 36.9 (06-10 @ 16:59)  HR: 74 (70 - 79)  BP: 101/63 (97/61 - 121/66)  RR: 18 (18 - 18)  SpO2: 97% (97% - 100%)  Wt(kg): --  I&O's Summary  I & Os for 24h ending 11 Jun 2017 07:00  =============================================  IN: 1620 ml / OUT: 0 ml / NET: 1620 ml    I & Os for current day (as of 11 Jun 2017 10:56)  =============================================  IN: 0 ml / OUT: 600 ml / NET: -600 ml      Appearance: Normal	no signs of acute distress   HEENT:   Normal oral mucosa, PERRL, EOMI	  Lymphatic: No lymphadenopathy  Cardiovascular: Normal S1 S2 RRR, No JVD, No murmurs, No edema  Respiratory: Lungs clear to auscultation	  Psychiatry: A & O x 3, Mood & affect appropriate  Gastrointestinal:  Soft, Non-tender, + BS	  Skin: No rashes, No ecchymoses, No cyanosis	  Neurologic: Non-focal  Extremities: AROM WFL's, No clubbing, cyanosis or edema  Vascular: Peripheral pulses palpable 2+ bilaterally    TELEMETRY: NSR   	    ECG:  	NSR     RADIOLOGY:  CXR     No focal consolidations.  No pleural effusion or pneumothorax.  Unremarkable cardiomediastinal silhouette.  Intact visualized osseous structures.    CT head  unremarkable noted C3-4 spinal fusion     CTPA   no PE  small hiatal hernia     OTHER: 	  	  LABS:  CE neg x 2  D Dimer 810  UA negative	 	    CARDIAC MARKERS:                                  8.1    5.4   )-----------( 172      ( 11 Jun 2017 08:26 )             24.9       06-11    143  |  111<H>  |  33<H>  ----------------------------<  98  5.1   |  23  |  1.05    Ca    8.1<L>      11 Jun 2017 04:02    TPro  6.3  /  Alb  3.8  /  TBili  0.2  /  DBili  x   /  AST  15  /  ALT  14  /  AlkPhos  43  06-10      proBNP: Serum Pro-Brain Natriuretic Peptide: 127 pg/mL (06-10 @ 12:51)      Lipid Profile:     HgA1c:     TSH: Thyroid Stimulating Hormone, Serum: 0.57 uIU/mL (06-10 @ 17:26)    Assessment and Plan:     68 y/o F with CAD, HTN, HLD  denies any history of arrhythmias CHF or MI  Farther had MI at age 66   P/W syncopal episode unwitnessed  found by family member.  She reports she does not remember event did not experience any lightheadedness or aura preceding event  & denies any head trauma. She denies any incontinence or seizure like activity, no chest pain or aura preceding. She does report 2 days of right occipital headache, similar to her usual headache.  Had mild nausea but no abdominal pain, no palpitations, no recent strenuous exercise, no new medications, no shortness of breath, no leg pain, no recent travel, no pleuritic chest pain, no blood in stool, no fever, no urinary complaints.   Reports she had stress test for per op many years ago denies hx CATH  Would contact Dr Augustine to verify cardiac history [[facility]]  CE neg x 2 / TELE w/ NSR / Elevated D Dimer PE r/o by CTPA / negative Orthostatics /CT head unremarkable   check ECHO   c/w TELE monitoring   c/w current medications aspirin enteric coated 81,IMDUR 60, ranolazine 500 Oral two times a day, diltiazem       Anemia       F/U GI recommendations possible EGD on Tuesday  transfuse prn     Further recommendations based on above

## 2017-06-11 NOTE — PROGRESS NOTE ADULT - SUBJECTIVE AND OBJECTIVE BOX
Patient is a 67y old  Female who presents with a chief complaint of     SUBJECTIVE / OVERNIGHT EVENTS: No nausea, vomiting or diarrhea, no fever or chills, no dizziness or chest pain, no dysuria or hematuria, no jt pain or swelling    MEDICATIONS  (STANDING):  aspirin enteric coated 81milliGRAM(s) Oral daily  buPROPion SR 200milliGRAM(s) Oral daily  isosorbide   mononitrate ER Tablet (IMDUR) 60milliGRAM(s) Oral daily  ranolazine 500milliGRAM(s) Oral two times a day  diltiazem   CD 180milliGRAM(s) Oral daily  clonazePAM Tablet 0.5milliGRAM(s) Oral two times a day  PARoxetine 30milliGRAM(s) Oral daily  cholecalciferol 1000Unit(s) Oral daily  sodium chloride 0.9%. 2000milliLiter(s) IV Continuous <Continuous>  heparin  Injectable 5000Unit(s) SubCutaneous every 12 hours  pantoprazole    Tablet 40milliGRAM(s) Oral before breakfast    MEDICATIONS  (PRN):      Vital Signs Last 24 Hrs  T(C): 36.8, Max: 36.9 (06-10 @ 16:59)  HR: 74 (70 - 76)  BP: 101/63 (101/63 - 121/66)  RR: 17 (17 - 18)  SpO2: 98% (97% - 100%)  Wt(kg): --  CAPILLARY BLOOD GLUCOSE    I&O's Summary  I & Os for 24h ending 11 Jun 2017 07:00  =============================================  IN: 1620 ml / OUT: 0 ml / NET: 1620 ml    I & Os for current day (as of 11 Jun 2017 13:29)  =============================================  IN: 300 ml / OUT: 600 ml / NET: -300 ml      PHYSICAL EXAM:  GENERAL: NAD, well-developed  HEAD:  Atraumatic, Normocephalic  EYES: EOMI, PERRLA, conjunctiva and sclera clear  NECK: Supple, No JVD  CHEST/LUNG: Clear to auscultation bilaterally; No wheeze  HEART: Regular rate and rhythm; No murmurs, rubs, or gallops  ABDOMEN: Soft, Nontender, Nondistended; Bowel sounds present  EXTREMITIES:  2+ Peripheral Pulses, No clubbing, cyanosis, or edema  PSYCH: AAOx3  NEUROLOGY: non-focal  SKIN: No rashes or lesions    LABS:                        8.1    5.4   )-----------( 172      ( 11 Jun 2017 08:26 )             24.9     06-11    143  |  111<H>  |  33<H>  ----------------------------<  98  5.1   |  23  |  1.05    Ca    8.1<L>      11 Jun 2017 04:02    TPro  6.3  /  Alb  3.8  /  TBili  0.2  /  DBili  x   /  AST  15  /  ALT  14  /  AlkPhos  43  06-10    PT/INR - ( 10 Jonny 2017 12:51 )   PT: 12.0 sec;   INR: 1.10 ratio         PTT - ( 10 Jonny 2017 12:51 )  PTT:27.4 sec  CARDIAC MARKERS ( 11 Jun 2017 11:33 )  x     / <0.01 ng/mL / 46 U/L / x     / 1.0 ng/mL  CARDIAC MARKERS ( 11 Jun 2017 04:02 )  x     / <0.01 ng/mL / 44 U/L / x     / 1.0 ng/mL  CARDIAC MARKERS ( 10 Jonny 2017 12:51 )  x     / <0.01 ng/mL / 56 U/L / x     / 1.1 ng/mL      Urinalysis Basic - ( 10 Jonny 2017 15:20 )    Color: PL Yellow / Appearance: Clear / SG: >1.030 / pH: x  Gluc: x / Ketone: Negative  / Bili: Negative / Urobili: Negative   Blood: x / Protein: Negative / Nitrite: Negative   Leuk Esterase: Negative / RBC: 0-2 /HPF / WBC 0-2 /HPF   Sq Epi: x / Non Sq Epi: OCC /HPF / Bacteria: x          Consultant(s) Notes Reviewed:      Care Discussed with Consultants/Other Providers:

## 2017-06-11 NOTE — CONSULT NOTE ADULT - PROBLEM SELECTOR RECOMMENDATION 2
- Monitor h/h and cbc, transfuse prn  - Check stool occult blood / iron studies   - PPI 40 mg po qd  - Diet as tolerated   - May need repeat egd if occult positive  - To follow - Monitor h/h and cbc, transfuse prn  - Check stool occult blood / iron studies   - PPI 40 mg po qd  - Diet as tolerated   - EGD tentatively booked for Tuesday if cardiac workup negative - medical clearance / cardiac clearance pending   - To follow

## 2017-06-12 LAB
ANION GAP SERPL CALC-SCNC: 11 MMOL/L — SIGNIFICANT CHANGE UP (ref 5–17)
APTT BLD: 32.5 SEC — SIGNIFICANT CHANGE UP (ref 27.5–37.4)
BUN SERPL-MCNC: 16 MG/DL — SIGNIFICANT CHANGE UP (ref 7–23)
CALCIUM SERPL-MCNC: 8.6 MG/DL — SIGNIFICANT CHANGE UP (ref 8.4–10.5)
CHLORIDE SERPL-SCNC: 108 MMOL/L — SIGNIFICANT CHANGE UP (ref 96–108)
CO2 SERPL-SCNC: 26 MMOL/L — SIGNIFICANT CHANGE UP (ref 22–31)
CREAT SERPL-MCNC: 1.16 MG/DL — SIGNIFICANT CHANGE UP (ref 0.5–1.3)
CULTURE RESULTS: SIGNIFICANT CHANGE UP
FERRITIN SERPL-MCNC: 30 NG/ML — SIGNIFICANT CHANGE UP (ref 15–150)
GLUCOSE SERPL-MCNC: 102 MG/DL — HIGH (ref 70–99)
HCT VFR BLD CALC: 24.6 % — LOW (ref 34.5–45)
HGB BLD-MCNC: 8.4 G/DL — LOW (ref 11.5–15.5)
INR BLD: 1.02 RATIO — SIGNIFICANT CHANGE UP (ref 0.88–1.16)
IRON SATN MFR SERPL: 26 % — SIGNIFICANT CHANGE UP (ref 14–50)
IRON SATN MFR SERPL: 69 UG/DL — SIGNIFICANT CHANGE UP (ref 30–160)
MCHC RBC-ENTMCNC: 34 PG — SIGNIFICANT CHANGE UP (ref 27–34)
MCHC RBC-ENTMCNC: 34.2 GM/DL — SIGNIFICANT CHANGE UP (ref 32–36)
MCV RBC AUTO: 99.2 FL — SIGNIFICANT CHANGE UP (ref 80–100)
PLATELET # BLD AUTO: 198 K/UL — SIGNIFICANT CHANGE UP (ref 150–400)
POTASSIUM SERPL-MCNC: 4.1 MMOL/L — SIGNIFICANT CHANGE UP (ref 3.5–5.3)
POTASSIUM SERPL-SCNC: 4.1 MMOL/L — SIGNIFICANT CHANGE UP (ref 3.5–5.3)
PROTHROM AB SERPL-ACNC: 11 SEC — SIGNIFICANT CHANGE UP (ref 9.8–12.7)
RBC # BLD: 2.48 M/UL — LOW (ref 3.8–5.2)
RBC # FLD: 12.4 % — SIGNIFICANT CHANGE UP (ref 10.3–14.5)
SODIUM SERPL-SCNC: 145 MMOL/L — SIGNIFICANT CHANGE UP (ref 135–145)
SPECIMEN SOURCE: SIGNIFICANT CHANGE UP
TIBC SERPL-MCNC: 268 UG/DL — SIGNIFICANT CHANGE UP (ref 220–430)
UIBC SERPL-MCNC: 199 UG/DL — SIGNIFICANT CHANGE UP (ref 110–370)
WBC # BLD: 4.4 K/UL — SIGNIFICANT CHANGE UP (ref 3.8–10.5)
WBC # FLD AUTO: 4.4 K/UL — SIGNIFICANT CHANGE UP (ref 3.8–10.5)

## 2017-06-12 PROCEDURE — 93306 TTE W/DOPPLER COMPLETE: CPT | Mod: 26

## 2017-06-12 RX ORDER — PREGABALIN 225 MG/1
1000 CAPSULE ORAL DAILY
Qty: 0 | Refills: 0 | Status: COMPLETED | OUTPATIENT
Start: 2017-06-12 | End: 2017-06-14

## 2017-06-12 RX ADMIN — ISOSORBIDE MONONITRATE 60 MILLIGRAM(S): 60 TABLET, EXTENDED RELEASE ORAL at 13:18

## 2017-06-12 RX ADMIN — PANTOPRAZOLE SODIUM 40 MILLIGRAM(S): 20 TABLET, DELAYED RELEASE ORAL at 06:49

## 2017-06-12 RX ADMIN — Medication 180 MILLIGRAM(S): at 06:48

## 2017-06-12 RX ADMIN — Medication 81 MILLIGRAM(S): at 13:18

## 2017-06-12 RX ADMIN — BUPROPION HYDROCHLORIDE 200 MILLIGRAM(S): 150 TABLET, EXTENDED RELEASE ORAL at 13:19

## 2017-06-12 RX ADMIN — RANOLAZINE 500 MILLIGRAM(S): 500 TABLET, FILM COATED, EXTENDED RELEASE ORAL at 06:49

## 2017-06-12 RX ADMIN — Medication 0.5 MILLIGRAM(S): at 06:48

## 2017-06-12 RX ADMIN — RANOLAZINE 500 MILLIGRAM(S): 500 TABLET, FILM COATED, EXTENDED RELEASE ORAL at 17:38

## 2017-06-12 RX ADMIN — HEPARIN SODIUM 5000 UNIT(S): 5000 INJECTION INTRAVENOUS; SUBCUTANEOUS at 06:48

## 2017-06-12 RX ADMIN — HEPARIN SODIUM 5000 UNIT(S): 5000 INJECTION INTRAVENOUS; SUBCUTANEOUS at 17:38

## 2017-06-12 RX ADMIN — Medication 0.5 MILLIGRAM(S): at 17:44

## 2017-06-12 RX ADMIN — Medication 30 MILLIGRAM(S): at 13:18

## 2017-06-12 RX ADMIN — Medication 1000 UNIT(S): at 13:18

## 2017-06-12 RX ADMIN — PREGABALIN 1000 MICROGRAM(S): 225 CAPSULE ORAL at 16:25

## 2017-06-12 NOTE — PROGRESS NOTE ADULT - SUBJECTIVE AND OBJECTIVE BOX
INTERVAL HPI/OVERNIGHT EVENTS:    no GI complaints    MEDICATIONS  (STANDING):  aspirin enteric coated 81milliGRAM(s) Oral daily  buPROPion SR 200milliGRAM(s) Oral daily  isosorbide   mononitrate ER Tablet (IMDUR) 60milliGRAM(s) Oral daily  ranolazine 500milliGRAM(s) Oral two times a day  diltiazem   CD 180milliGRAM(s) Oral daily  clonazePAM Tablet 0.5milliGRAM(s) Oral two times a day  PARoxetine 30milliGRAM(s) Oral daily  cholecalciferol 1000Unit(s) Oral daily  sodium chloride 0.9%. 2000milliLiter(s) IV Continuous <Continuous>  heparin  Injectable 5000Unit(s) SubCutaneous every 12 hours  pantoprazole    Tablet 40milliGRAM(s) Oral before breakfast  cyanocobalamin Injectable 1000MICROGram(s) IntraMuscular daily    MEDICATIONS  (PRN):      Allergies    latex (Rash)  No Known Drug Allergies  shellfish (Hives)    Intolerances        ROS:   General:  No wt loss, fevers, chills, night sweats, fatigue,   Eyes:  Good vision, no reported pain  ENT:  No sore throat, pain, runny nose, dysphagia  CV:  No pain, palpitations, hypo/hypertension  Resp:  No dyspnea, cough, tachypnea, wheezing  GI:  No pain, No nausea, No vomiting, No diarrhea, No constipation, No weight loss, No fever, No pruritis, No rectal bleeding, No tarry stools, No dysphagia,  :  No pain, bleeding, incontinence, nocturia  Muscle:  No pain, weakness  Neuro:  No weakness, tingling, memory problems  Psych:  No fatigue, insomnia, mood problems, depression  Endocrine:  No polyuria, polydipsia, cold/heat intolerance  Heme:  No petechiae, ecchymosis, easy bruisability  Skin:  No rash, tattoos, scars, edema      PHYSICAL EXAM:   Vital Signs:  Vital Signs Last 24 Hrs  T(C): 36.7, Max: 36.8 (-12 @ 04:26)  T(F): 98, Max: 98.2 (-12 @ 04:26)  HR: 70 (70 - 82)  BP: 111/70 (103/63 - 111/70)  BP(mean): --  RR: 17 (17 - 18)  SpO2: 100% (97% - 100%)  Daily     Daily Weight in k (2017 04:26)    GENERAL:  Appears stated age, well-groomed, well-nourished, no distress  HEENT:  NC/AT,  conjunctivae clear and pink, no thyromegaly, nodules, adenopathy, no JVD, sclera -anicteric  CHEST:  Full & symmetric excursion, no increased effort, breath sounds clear  HEART:  Regular rhythm, S1, S2, no murmur/rub/S3/S4, no abdominal bruit, no edema  ABDOMEN:  Soft, non-tender, non-distended, normoactive bowel sounds,  no masses ,no hepato-splenomegaly, no signs of chronic liver disease  EXTEREMITIES:  no cyanosis,clubbing or edema  SKIN:  No rash/erythema/ecchymoses/petechiae/wounds/abscess/warm/dry  NEURO:  Alert, oriented, no asterixis, no tremor, no encephalopathy      LABS:                        8.4    4.4   )-----------( 198      ( 2017 07:13 )             24.6     06-12    145  |  108  |  16  ----------------------------<  102<H>  4.1   |  26  |  1.16    Ca    8.6      2017 07:13      PT/INR - ( 2017 07:13 )   PT: 11.0 sec;   INR: 1.02 ratio         PTT - ( 2017 07:13 )  PTT:32.5 sec      RADIOLOGY & ADDITIONAL TESTS:

## 2017-06-12 NOTE — PROGRESS NOTE ADULT - SUBJECTIVE AND OBJECTIVE BOX
Subjective: pt seen and examined , no chest pain , ROS -.  	  MEDICATIONS:  MEDICATIONS  (STANDING):  aspirin enteric coated 81milliGRAM(s) Oral daily  buPROPion SR 200milliGRAM(s) Oral daily  isosorbide   mononitrate ER Tablet (IMDUR) 60milliGRAM(s) Oral daily  ranolazine 500milliGRAM(s) Oral two times a day  diltiazem   CD 180milliGRAM(s) Oral daily  clonazePAM Tablet 0.5milliGRAM(s) Oral two times a day  PARoxetine 30milliGRAM(s) Oral daily  cholecalciferol 1000Unit(s) Oral daily  sodium chloride 0.9%. 2000milliLiter(s) IV Continuous <Continuous>  heparin  Injectable 5000Unit(s) SubCutaneous every 12 hours  pantoprazole    Tablet 40milliGRAM(s) Oral before breakfast      PHYSICAL EXAM:  T(C): 36.8, Max: 36.8 (06-11 @ 12:18)  HR: 74 (74 - 82)  BP: 111/70 (98/50 - 111/70)  RR: 18 (17 - 18)  SpO2: 97% (97% - 98%)  Wt(kg): --  I&O's Summary  I & Os for 24h ending 11 Jun 2017 07:00  =============================================  IN: 1620 ml / OUT: 0 ml / NET: 1620 ml    I & Os for current day (as of 12 Jun 2017 05:27)  =============================================  IN: 1940 ml / OUT: 600 ml / NET: 1340 ml        Appearance: Normal	  HEENT:   Normal oral mucosa, PERRL, EOMI	  Lymphatic: No lymphadenopathy , no edema  Cardiovascular: Normal S1 S2, No JVD, No murmurs , Peripheral pulses palpable 2+ bilaterally  Respiratory: Lungs clear to auscultation, normal effort 	  Gastrointestinal:  Soft, Non-tender, + BS	  Skin: No rashes, No ecchymoses, No cyanosis, warm to touch  Musculoskeletal: Normal range of motion, normal strength  Psychiatry:  Mood & affect appropriate    TELEMETRY: 	  NSR  ECG:  	  RADIOLOGY:   DIAGNOSTIC TESTING:  [ ] Echocardiogram:   [ ]  Catheterization:  [ ] Stress Test:    OTHER: 	    LABS:	 	    CARDIAC MARKERS:  CARDIAC MARKERS ( 11 Jun 2017 11:33 )  x     / <0.01 ng/mL / 46 U/L / x     / 1.0 ng/mL  CARDIAC MARKERS ( 11 Jun 2017 04:02 )  x     / <0.01 ng/mL / 44 U/L / x     / 1.0 ng/mL  CARDIAC MARKERS ( 10 Jonny 2017 12:51 )  x     / <0.01 ng/mL / 56 U/L / x     / 1.1 ng/mL                                8.1    5.4   )-----------( 172      ( 11 Jun 2017 08:26 )             24.9     06-11    143  |  111<H>  |  33<H>  ----------------------------<  98  5.1   |  23  |  1.05    Ca    8.1<L>      11 Jun 2017 04:02    TPro  6.3  /  Alb  3.8  /  TBili  0.2  /  DBili  x   /  AST  15  /  ALT  14  /  AlkPhos  43  06-10    proBNP:   Lipid Profile:   HgA1c:   TSH:     ASSESSMENT/PLAN: 	67y Female hx of HTN,CAD, Chol, spinal sx , now syncope, LOC    -contact primary Cardiologist to obtain outpatient records  -cont tele  -cont trend h/h  -echo pending , evaluate structural heart.   -cont Cardizem , isosorbide, ASA, Ranexa   cont GI / DVT prophylaxis.  cont keep K>4, mag >2.0   IV hydration, trend creatine   GI follow up   D/W  D/W Dr Rahman

## 2017-06-12 NOTE — PROGRESS NOTE ADULT - SUBJECTIVE AND OBJECTIVE BOX
Patient is a 67y old  Female who presents with a chief complaint of syncope    SUBJECTIVE / OVERNIGHT EVENTS: No nausea, vomiting or diarrhea, no fever or chills, no dizziness or chest pain, no dysuria or hematuria, no jt pain or swelling    No events overnight    MEDICATIONS  (STANDING):  aspirin enteric coated 81milliGRAM(s) Oral daily  buPROPion SR 200milliGRAM(s) Oral daily  isosorbide   mononitrate ER Tablet (IMDUR) 60milliGRAM(s) Oral daily  ranolazine 500milliGRAM(s) Oral two times a day  diltiazem   CD 180milliGRAM(s) Oral daily  clonazePAM Tablet 0.5milliGRAM(s) Oral two times a day  PARoxetine 30milliGRAM(s) Oral daily  cholecalciferol 1000Unit(s) Oral daily  sodium chloride 0.9%. 2000milliLiter(s) IV Continuous <Continuous>  heparin  Injectable 5000Unit(s) SubCutaneous every 12 hours  pantoprazole    Tablet 40milliGRAM(s) Oral before breakfast    MEDICATIONS  (PRN):      Vital Signs Last 24 Hrs  T(C): 36.8, Max: 36.8 (06-12 @ 04:26)  HR: 74 (74 - 82)  BP: 111/70 (98/50 - 111/70)  RR: 18 (18 - 18)  SpO2: 97% (97% - 98%)  Wt(kg): --  CAPILLARY BLOOD GLUCOSE    I&O's Summary  I & Os for 24h ending 12 Jun 2017 07:00  =============================================  IN: 1940 ml / OUT: 600 ml / NET: 1340 ml    I & Os for current day (as of 12 Jun 2017 12:31)  =============================================  IN: 360 ml / OUT: 0 ml / NET: 360 ml      PHYSICAL EXAM:  GENERAL: NAD, well-developed  HEAD:  Atraumatic, Normocephalic  EYES: EOMI, PERRLA, conjunctiva and sclera clear  NECK: Supple, No JVD  CHEST/LUNG: Clear to auscultation bilaterally; No wheeze  HEART: Regular rate and rhythm; No murmurs, rubs, or gallops  ABDOMEN: Soft, Nontender, Nondistended; Bowel sounds present  EXTREMITIES:  2+ Peripheral Pulses, No clubbing, cyanosis, or edema  PSYCH: AAOx3  NEUROLOGY: non-focal  SKIN: No rashes or lesions    LABS:                        8.4    4.4   )-----------( 198      ( 12 Jun 2017 07:13 )             24.6     06-12    145  |  108  |  16  ----------------------------<  102<H>  4.1   |  26  |  1.16    Ca    8.6      12 Jun 2017 07:13    TPro  6.3  /  Alb  3.8  /  TBili  0.2  /  DBili  x   /  AST  15  /  ALT  14  /  AlkPhos  43  06-10    PT/INR - ( 12 Jun 2017 07:13 )   PT: 11.0 sec;   INR: 1.02 ratio         PTT - ( 12 Jun 2017 07:13 )  PTT:32.5 sec  CARDIAC MARKERS ( 11 Jun 2017 11:33 )  x     / <0.01 ng/mL / 46 U/L / x     / 1.0 ng/mL  CARDIAC MARKERS ( 11 Jun 2017 04:02 )  x     / <0.01 ng/mL / 44 U/L / x     / 1.0 ng/mL  CARDIAC MARKERS ( 10 Jonny 2017 12:51 )  x     / <0.01 ng/mL / 56 U/L / x     / 1.1 ng/mL      Urinalysis Basic - ( 10 Jonny 2017 15:20 )    Color: PL Yellow / Appearance: Clear / SG: >1.030 / pH: x  Gluc: x / Ketone: Negative  / Bili: Negative / Urobili: Negative   Blood: x / Protein: Negative / Nitrite: Negative   Leuk Esterase: Negative / RBC: 0-2 /HPF / WBC 0-2 /HPF   Sq Epi: x / Non Sq Epi: OCC /HPF / Bacteria: x          Consultant(s) Notes Reviewed:      Care Discussed with Consultants/Other Providers:    Contact Number, Dr Rose [phone]

## 2017-06-12 NOTE — PROGRESS NOTE ADULT - PROBLEM SELECTOR PLAN 1
recent EGD shows gastric ulcer  plan for repeat  upper gastrointestinal endoscopy in am  npo p mn  discussed with patient

## 2017-06-13 LAB
BLD GP AB SCN SERPL QL: NEGATIVE — SIGNIFICANT CHANGE UP
HCT VFR BLD CALC: 21.9 % — LOW (ref 34.5–45)
HCT VFR BLD CALC: 36.2 % — SIGNIFICANT CHANGE UP (ref 34.5–45)
HGB BLD-MCNC: 12 G/DL — SIGNIFICANT CHANGE UP (ref 11.5–15.5)
HGB BLD-MCNC: 7.6 G/DL — LOW (ref 11.5–15.5)
MCHC RBC-ENTMCNC: 31.5 PG — SIGNIFICANT CHANGE UP (ref 27–34)
MCHC RBC-ENTMCNC: 33.1 GM/DL — SIGNIFICANT CHANGE UP (ref 32–36)
MCHC RBC-ENTMCNC: 34.3 PG — HIGH (ref 27–34)
MCHC RBC-ENTMCNC: 34.8 GM/DL — SIGNIFICANT CHANGE UP (ref 32–36)
MCV RBC AUTO: 95.1 FL — SIGNIFICANT CHANGE UP (ref 80–100)
MCV RBC AUTO: 98.8 FL — SIGNIFICANT CHANGE UP (ref 80–100)
PLATELET # BLD AUTO: 185 K/UL — SIGNIFICANT CHANGE UP (ref 150–400)
PLATELET # BLD AUTO: 222 K/UL — SIGNIFICANT CHANGE UP (ref 150–400)
RBC # BLD: 2.22 M/UL — LOW (ref 3.8–5.2)
RBC # BLD: 3.81 M/UL — SIGNIFICANT CHANGE UP (ref 3.8–5.2)
RBC # FLD: 12.4 % — SIGNIFICANT CHANGE UP (ref 10.3–14.5)
RBC # FLD: 13.6 % — SIGNIFICANT CHANGE UP (ref 10.3–14.5)
RH IG SCN BLD-IMP: POSITIVE — SIGNIFICANT CHANGE UP
WBC # BLD: 4.6 K/UL — SIGNIFICANT CHANGE UP (ref 3.8–10.5)
WBC # BLD: 6.2 K/UL — SIGNIFICANT CHANGE UP (ref 3.8–10.5)
WBC # FLD AUTO: 4.6 K/UL — SIGNIFICANT CHANGE UP (ref 3.8–10.5)
WBC # FLD AUTO: 6.2 K/UL — SIGNIFICANT CHANGE UP (ref 3.8–10.5)

## 2017-06-13 RX ADMIN — BUPROPION HYDROCHLORIDE 200 MILLIGRAM(S): 150 TABLET, EXTENDED RELEASE ORAL at 15:51

## 2017-06-13 RX ADMIN — Medication 30 MILLIGRAM(S): at 15:52

## 2017-06-13 RX ADMIN — RANOLAZINE 500 MILLIGRAM(S): 500 TABLET, FILM COATED, EXTENDED RELEASE ORAL at 05:35

## 2017-06-13 RX ADMIN — Medication 0.5 MILLIGRAM(S): at 05:38

## 2017-06-13 RX ADMIN — Medication 1000 UNIT(S): at 15:52

## 2017-06-13 RX ADMIN — RANOLAZINE 500 MILLIGRAM(S): 500 TABLET, FILM COATED, EXTENDED RELEASE ORAL at 17:30

## 2017-06-13 RX ADMIN — Medication 180 MILLIGRAM(S): at 05:35

## 2017-06-13 RX ADMIN — PANTOPRAZOLE SODIUM 40 MILLIGRAM(S): 20 TABLET, DELAYED RELEASE ORAL at 05:35

## 2017-06-13 RX ADMIN — PREGABALIN 1000 MICROGRAM(S): 225 CAPSULE ORAL at 15:50

## 2017-06-13 RX ADMIN — ISOSORBIDE MONONITRATE 60 MILLIGRAM(S): 60 TABLET, EXTENDED RELEASE ORAL at 15:52

## 2017-06-13 RX ADMIN — Medication 0.5 MILLIGRAM(S): at 17:36

## 2017-06-13 NOTE — PROGRESS NOTE ADULT - SUBJECTIVE AND OBJECTIVE BOX
INTERVAL HPI/OVERNIGHT EVENTS:  Pt S/E -- without abdominal complaints  Tolerating Diet  BM x 1 -- "black" stool    MEDICATIONS  (STANDING):  aspirin enteric coated 81milliGRAM(s) Oral daily  buPROPion SR 200milliGRAM(s) Oral daily  isosorbide   mononitrate ER Tablet (IMDUR) 60milliGRAM(s) Oral daily  ranolazine 500milliGRAM(s) Oral two times a day  diltiazem   CD 180milliGRAM(s) Oral daily  clonazePAM Tablet 0.5milliGRAM(s) Oral two times a day  PARoxetine 30milliGRAM(s) Oral daily  cholecalciferol 1000Unit(s) Oral daily  heparin  Injectable 5000Unit(s) SubCutaneous every 12 hours  pantoprazole    Tablet 40milliGRAM(s) Oral before breakfast  cyanocobalamin Injectable 1000MICROGram(s) IntraMuscular daily    MEDICATIONS  (PRN):      Allergies    latex (Rash)  No Known Drug Allergies  shellfish (Hives)      ROS:   General:  No wt loss, fevers, chills, night sweats, fatigue,   Eyes:  Good vision, no reported pain  ENT:  No sore throat, pain, runny nose, dysphagia  CV:  No pain, palpitations, hypo/hypertension  Resp:  No dyspnea, cough, tachypnea, wheezing  GI:  No pain, No nausea, No vomiting, No diarrhea, No constipation, No weight loss, No fever, No pruritis, No rectal bleeding No dysphagia,  :  No pain, bleeding, incontinence, nocturia  Muscle:  No pain, weakness  Neuro:  No weakness, tingling, memory problems  Psych:  No fatigue, insomnia, mood problems, depression  Endocrine:  No polyuria, polydipsia, cold/heat intolerance  Heme:  No petechiae, ecchymosis, easy bruisability  Skin:  No rash, tattoos, scars, edema      PHYSICAL EXAM:   Vital Signs:  Vital Signs Last 24 Hrs  T(C): 36.6, Max: 36.7 (06-12 @ 21:33)  T(F): 97.8, Max: 98 (06-12 @ 21:33)  HR: 68 (68 - 79)  BP: 96/59 (96/59 - 124/69)  BP(mean): --  RR: 18 (17 - 18)  SpO2: 96% (96% - 98%)  Daily     Daily Weight in k.4 (2017 05:31)    GENERAL:  Appears stated age, well-groomed, well-nourished, no distress  HEENT:  NC/AT,  conjunctivae clear and pink, no thyromegaly, nodules, adenopathy, no JVD, sclera -anicteric  CHEST:  Full & symmetric excursion, no increased effort, breath sounds clear  HEART:  Regular rhythm, S1, S2, no murmur/rub/S3/S4, no abdominal bruit, no edema  ABDOMEN:  Soft, non-tender, non-distended, normoactive bowel sounds,  no masses ,no hepato-splenomegaly, no signs of chronic liver disease  EXTEREMITIES:  no cyanosis,clubbing or edema  SKIN:  No rash/erythema/ecchymoses/petechiae/wounds/abscess/warm/dry  NEURO:  Alert, oriented, no asterixis, no tremor, no encephalopathy      LABS:                        7.6    4.6   )-----------( 185      ( 2017 06:59 )             21.9     06-12    145  |  108  |  16  ----------------------------<  102<H>  4.1   |  26  |  1.16    Ca    8.6      2017 07:13      PT/INR - ( 2017 07:13 )   PT: 11.0 sec;   INR: 1.02 ratio         PTT - ( 2017 07:13 )  PTT:32.5 sec      RADIOLOGY & ADDITIONAL TESTS:  ----

## 2017-06-13 NOTE — PROGRESS NOTE ADULT - SUBJECTIVE AND OBJECTIVE BOX
Patient is a 67y old  Female who presents with a chief complaint of     SUBJECTIVE / OVERNIGHT EVENTS: No nausea, vomiting or diarrhea, no fever or chills, no dizziness or chest pain, no dysuria or hematuria, no jt pain or swelling    MEDICATIONS  (STANDING):  aspirin enteric coated 81milliGRAM(s) Oral daily  buPROPion SR 200milliGRAM(s) Oral daily  isosorbide   mononitrate ER Tablet (IMDUR) 60milliGRAM(s) Oral daily  ranolazine 500milliGRAM(s) Oral two times a day  diltiazem   CD 180milliGRAM(s) Oral daily  clonazePAM Tablet 0.5milliGRAM(s) Oral two times a day  PARoxetine 30milliGRAM(s) Oral daily  cholecalciferol 1000Unit(s) Oral daily  heparin  Injectable 5000Unit(s) SubCutaneous every 12 hours  pantoprazole    Tablet 40milliGRAM(s) Oral before breakfast  cyanocobalamin Injectable 1000MICROGram(s) IntraMuscular daily    MEDICATIONS  (PRN):      Vital Signs Last 24 Hrs  T(C): 36.4, Max: 36.7 (06-12 @ 12:30)  HR: 69 (69 - 79)  BP: 122/76 (122/76 - 124/69)  RR: 18 (17 - 18)  SpO2: 98% (98% - 100%)  Wt(kg): --  CAPILLARY BLOOD GLUCOSE    I&O's Summary  I & Os for 24h ending 13 Jun 2017 07:00  =============================================  IN: 880 ml / OUT: 0 ml / NET: 880 ml    I & Os for current day (as of 13 Jun 2017 12:16)  =============================================  IN: 0 ml / OUT: 0 ml / NET: 0 ml      PHYSICAL EXAM:  GENERAL: NAD, well-developed  HEAD:  Atraumatic, Normocephalic  EYES: EOMI, PERRLA, conjunctiva and sclera clear mild pallor  NECK: Supple, No JVD  CHEST/LUNG: Clear to auscultation bilaterally; No wheeze  HEART: Regular rate and rhythm; No murmurs, rubs, or gallops  ABDOMEN: Soft, Nontender, Nondistended; Bowel sounds present  EXTREMITIES:  2+ Peripheral Pulses, No clubbing, cyanosis, or edema  PSYCH: AAOx3  NEUROLOGY: non-focal  SKIN: No rashes or lesions    LABS:                        7.6    4.6   )-----------( 185      ( 13 Jun 2017 06:59 )             21.9     06-12    145  |  108  |  16  ----------------------------<  102<H>  4.1   |  26  |  1.16    Ca    8.6      12 Jun 2017 07:13      PT/INR - ( 12 Jun 2017 07:13 )   PT: 11.0 sec;   INR: 1.02 ratio         PTT - ( 12 Jun 2017 07:13 )  PTT:32.5 sec            Consultant(s) Notes Reviewed:      Care Discussed with Consultants/Other Providers:    Contact Number, Dr Rose [phone]

## 2017-06-13 NOTE — PROGRESS NOTE ADULT - PROBLEM SELECTOR PLAN 1
h/o outpatient positive stress test which pt refused to have an angiogram for  D/W her today and she is now amenable for same  will need EGD prior to and cardiac intervention

## 2017-06-13 NOTE — PROVIDER CONTACT NOTE (OTHER) - BACKGROUND
Patient admitted for Syncope and Collapse, B12 Deficiency, Anemia, Hypertension, Hypercholesterolemia, Coronary Artery Disease.

## 2017-06-13 NOTE — PROGRESS NOTE ADULT - SUBJECTIVE AND OBJECTIVE BOX
Subjective: pt seen and examined , no chest pain , ROS -.    aspirin enteric coated 81milliGRAM(s) Oral daily  buPROPion SR 200milliGRAM(s) Oral daily  isosorbide   mononitrate ER Tablet (IMDUR) 60milliGRAM(s) Oral daily  ranolazine 500milliGRAM(s) Oral two times a day  diltiazem   CD 180milliGRAM(s) Oral daily  clonazePAM Tablet 0.5milliGRAM(s) Oral two times a day  PARoxetine 30milliGRAM(s) Oral daily  cholecalciferol 1000Unit(s) Oral daily  heparin  Injectable 5000Unit(s) SubCutaneous every 12 hours  pantoprazole    Tablet 40milliGRAM(s) Oral before breakfast  cyanocobalamin Injectable 1000MICROGram(s) IntraMuscular daily                            8.4    4.4   )-----------( 198      ( 2017 07:13 )             24.6       Hemoglobin: 8.4 g/dL ( @ 07:13)  Hemoglobin: 8.1 g/dL ( @ 08:26)  Hemoglobin: 8.4 g/dL ( @ 04:02)  Hemoglobin: 10.2 g/dL (06-10 @ 12:51)          145  |  108  |  16  ----------------------------<  102<H>  4.1   |  26  |  1.16    Ca    8.6      2017 07:13      Creatinine Trend: 1.16<--, 1.05<--, 1.08<--    COAGS:     CARDIAC MARKERS ( 2017 11:33 )  x     / <0.01 ng/mL / 46 U/L / x     / 1.0 ng/mL  CARDIAC MARKERS ( 2017 04:02 )  x     / <0.01 ng/mL / 44 U/L / x     / 1.0 ng/mL  CARDIAC MARKERS ( 10 Jonny 2017 12:51 )  x     / <0.01 ng/mL / 56 U/L / x     / 1.1 ng/mL        T(C): 36.7, Max: 36.8 ( @ 04:26)  HR: 79 (70 - 79)  BP: 124/69 (111/70 - 124/69)  RR: 17 (17 - 18)  SpO2: 98% (97% - 100%)  Wt(kg): --    I&O's Summary  I & Os for 24h ending 2017 07:00  =============================================  IN: 1940 ml / OUT: 600 ml / NET: 1340 ml    I & Os for current day (as of 2017 04:21)  =============================================  IN: 880 ml / OUT: 0 ml / NET: 880 ml      Daily     Daily Weight in k (2017 04:26)      Appearance: Normal	  HEENT:   Normal oral mucosa, PERRL, EOMI	  Lymphatic: No lymphadenopathy , no edema  Cardiovascular: Normal S1 S2, No JVD, No murmurs , Peripheral pulses palpable 2+ bilaterally  Respiratory: Lungs clear to auscultation, normal effort 	  Gastrointestinal:  Soft, Non-tender, + BS	  Skin: No rashes, No ecchymoses, No cyanosis, warm to touch  Musculoskeletal: Normal range of motion, normal strength  Psychiatry:  Mood & affect appropriate    TELEMETRY: 	  NSR  ECG:  	    RADIOLOGY:   DIAGNOSTIC TESTING:  [x ] Echocardiogram:   Conclusions:  1. Mild mitral regurgitation (severity may be  underestimated).  2. Normal left ventricular internal dimensions and wall  thicknesses.  3. Normal left ventricular systolic function. No segmental  wall motion abnormalities.  4. Mild-moderate tricuspid regurgitation.  5. Mild pulmonic regurgitation. Estimated pulmonary artery  systolic pressure equals 42  mm Hg, assuming right atrial  pressure equals 8 mm Hg, consistent with mild pulmonary  pressures.  ------------------------------------------------------------------------  Confirmed on  2017 - 14:55:54 by Pradip Clarke M.D.  ------------------------------------------------------------------------    [ ]  Catheterization:  [ ] Stress Test:    OTHER: 	      ASSESSMENT/PLAN: 	67y Female hx of HTN,CAD, Chol, spinal sx , now syncope, LOC    -contact primary Cardiologist to obtain outpatient records  -cont tele  -cont trend h/h  -cont Cardizem , isosorbide, ASA, Ranexa   cont GI / DVT prophylaxis.  cont keep K>4, mag >2.0   GI follow up -egd today  cardiac marker negative x 3 sets.  pt optimized for EGD    D/W Dr Rahman

## 2017-06-13 NOTE — PROGRESS NOTE ADULT - PROBLEM SELECTOR PLAN 1
- EGD for today cancelled - EGD to be re-booked for tomorrow  - NPO p MN  - H/H and cbc monitoring - to receive 2 units prbcs today  - Monitor stools  - Repeat cbc after transfusion completed

## 2017-06-13 NOTE — PROVIDER CONTACT NOTE (OTHER) - ASSESSMENT
Patient's Lab Complete Blood Count result: RBC Count 3.81. Hemoglobin 12.0. Hematocrit 36.2. Platelet Count - Automated 222.

## 2017-06-13 NOTE — PROVIDER CONTACT NOTE (OTHER) - ACTION/TREATMENT ORDERED:
PA aware and reviewed patient's Lab Complete Blood Count results, all other lab results and orders. PA states no new orders at this time.

## 2017-06-14 ENCOUNTER — RESULT REVIEW (OUTPATIENT)
Age: 67
End: 2017-06-14

## 2017-06-14 LAB
ANION GAP SERPL CALC-SCNC: 12 MMOL/L — SIGNIFICANT CHANGE UP (ref 5–17)
APTT BLD: 27.8 SEC — SIGNIFICANT CHANGE UP (ref 27.5–37.4)
BLD GP AB SCN SERPL QL: NEGATIVE — SIGNIFICANT CHANGE UP
BUN SERPL-MCNC: 22 MG/DL — SIGNIFICANT CHANGE UP (ref 7–23)
CALCIUM SERPL-MCNC: 8.9 MG/DL — SIGNIFICANT CHANGE UP (ref 8.4–10.5)
CHLORIDE SERPL-SCNC: 105 MMOL/L — SIGNIFICANT CHANGE UP (ref 96–108)
CO2 SERPL-SCNC: 24 MMOL/L — SIGNIFICANT CHANGE UP (ref 22–31)
CREAT SERPL-MCNC: 1.14 MG/DL — SIGNIFICANT CHANGE UP (ref 0.5–1.3)
GLUCOSE SERPL-MCNC: 94 MG/DL — SIGNIFICANT CHANGE UP (ref 70–99)
HCT VFR BLD CALC: 34.5 % — SIGNIFICANT CHANGE UP (ref 34.5–45)
HGB BLD-MCNC: 11.5 G/DL — SIGNIFICANT CHANGE UP (ref 11.5–15.5)
INR BLD: 0.92 RATIO — SIGNIFICANT CHANGE UP (ref 0.88–1.16)
MCHC RBC-ENTMCNC: 31.6 PG — SIGNIFICANT CHANGE UP (ref 27–34)
MCHC RBC-ENTMCNC: 33.3 GM/DL — SIGNIFICANT CHANGE UP (ref 32–36)
MCV RBC AUTO: 94.7 FL — SIGNIFICANT CHANGE UP (ref 80–100)
PLATELET # BLD AUTO: 221 K/UL — SIGNIFICANT CHANGE UP (ref 150–400)
POTASSIUM SERPL-MCNC: 4.3 MMOL/L — SIGNIFICANT CHANGE UP (ref 3.5–5.3)
POTASSIUM SERPL-SCNC: 4.3 MMOL/L — SIGNIFICANT CHANGE UP (ref 3.5–5.3)
PROTHROM AB SERPL-ACNC: 9.9 SEC — SIGNIFICANT CHANGE UP (ref 9.8–12.7)
RBC # BLD: 3.64 M/UL — LOW (ref 3.8–5.2)
RBC # FLD: 13.9 % — SIGNIFICANT CHANGE UP (ref 10.3–14.5)
RH IG SCN BLD-IMP: POSITIVE — SIGNIFICANT CHANGE UP
SODIUM SERPL-SCNC: 141 MMOL/L — SIGNIFICANT CHANGE UP (ref 135–145)
WBC # BLD: 6 K/UL — SIGNIFICANT CHANGE UP (ref 3.8–10.5)
WBC # FLD AUTO: 6 K/UL — SIGNIFICANT CHANGE UP (ref 3.8–10.5)

## 2017-06-14 PROCEDURE — 88312 SPECIAL STAINS GROUP 1: CPT | Mod: 26

## 2017-06-14 PROCEDURE — 88305 TISSUE EXAM BY PATHOLOGIST: CPT | Mod: 26

## 2017-06-14 RX ORDER — PANTOPRAZOLE SODIUM 20 MG/1
40 TABLET, DELAYED RELEASE ORAL
Qty: 0 | Refills: 0 | Status: DISCONTINUED | OUTPATIENT
Start: 2017-06-14 | End: 2017-06-16

## 2017-06-14 RX ADMIN — PREGABALIN 1000 MICROGRAM(S): 225 CAPSULE ORAL at 09:07

## 2017-06-14 RX ADMIN — BUPROPION HYDROCHLORIDE 200 MILLIGRAM(S): 150 TABLET, EXTENDED RELEASE ORAL at 09:07

## 2017-06-14 RX ADMIN — RANOLAZINE 500 MILLIGRAM(S): 500 TABLET, FILM COATED, EXTENDED RELEASE ORAL at 16:49

## 2017-06-14 RX ADMIN — ISOSORBIDE MONONITRATE 60 MILLIGRAM(S): 60 TABLET, EXTENDED RELEASE ORAL at 09:07

## 2017-06-14 RX ADMIN — RANOLAZINE 500 MILLIGRAM(S): 500 TABLET, FILM COATED, EXTENDED RELEASE ORAL at 05:22

## 2017-06-14 RX ADMIN — PANTOPRAZOLE SODIUM 40 MILLIGRAM(S): 20 TABLET, DELAYED RELEASE ORAL at 05:21

## 2017-06-14 RX ADMIN — Medication 30 MILLIGRAM(S): at 09:07

## 2017-06-14 RX ADMIN — Medication 0.5 MILLIGRAM(S): at 16:49

## 2017-06-14 RX ADMIN — Medication 0.5 MILLIGRAM(S): at 05:21

## 2017-06-14 RX ADMIN — Medication 1000 UNIT(S): at 09:07

## 2017-06-14 RX ADMIN — Medication 180 MILLIGRAM(S): at 05:22

## 2017-06-14 NOTE — PROGRESS NOTE ADULT - SUBJECTIVE AND OBJECTIVE BOX
Pre-Endoscopy Evaluation      Referring Physician: Pito Rose MD                                   Procedure: EGD    Indication for Procedure: Anemia, GIB    Pertinent History:    Sedation by Anesthesia [ x]    PAST MEDICAL & SURGICAL HISTORY:  CAD (coronary artery disease)  Hypercholesterolemia  Hypertension  No significant past surgical history      PMH of Gastroparesis [ ]  Gastric Surgery [ ]  Gastric Outlet Obstruction [ ] no    Allergies:    latex (Rash)  No Known Drug Allergies  shellfish (Hives)    Latex allergy: [x ] yes [ ] no    Medications:MEDICATIONS  (STANDING):  buPROPion SR 200milliGRAM(s) Oral daily  isosorbide   mononitrate ER Tablet (IMDUR) 60milliGRAM(s) Oral daily  ranolazine 500milliGRAM(s) Oral two times a day  diltiazem   CD 180milliGRAM(s) Oral daily  clonazePAM Tablet 0.5milliGRAM(s) Oral two times a day  PARoxetine 30milliGRAM(s) Oral daily  cholecalciferol 1000Unit(s) Oral daily  pantoprazole    Tablet 40milliGRAM(s) Oral before breakfast    MEDICATIONS  (PRN):      Smoking: [ ] yes  [ x] no    AICD/PPM: [ ] yes   [x ] no    Pertinent lab data:                        11.5   6.0   )-----------( 221      ( 2017 06:17 )             34.5     -14    141  |  105  |  22  ----------------------------<  94  4.3   |  24  |  1.14    Ca    8.9      2017 06:17      PT/INR - ( 2017 06:17 )   PT: 9.9 sec;   INR: 0.92 ratio         PTT - ( 2017 06:17 )  PTT:27.8 sec      Physical Examination:      Daily Weight in k.4 (2017 05:14)  Vital Signs Last 24 Hrs  T(C): 36.7, Max: 36.8 ( @ 16:40)  T(F): 98, Max: 98.3 ( @ 16:40)  HR: 62 (62 - 82)  BP: 115/60 (102/62 - 115/60)  BP(mean): --  RR: 18 (18 - 18)  SpO2: 97% (96% - 97%)    BP:                 HR:                  SPO2:               Temperature:    Constitutional: NAD    HEENT: Atraumatic      Neck:  No JVD    Respiratory: CTAB/L    Cardiovascular: S1 and S2    Gastrointestinal: BS+, soft, NT/ND    Extremities: No peripheral edema    Comments:    ASA Class: I [ ]  II [ ]  III [x ]  IV [ ]    The patient is a suitable candidate for the planned procedure unless box checked [ ]  No, explain: Pre-Endoscopy Evaluation      Referring Physician: Pito Rose MD                                   Procedure: EGD    Indication for Procedure: Anemia, GIB    Pertinent History: 68 yo female admitted for syncope work-up with dark stools, acute blood loss anemia requiring PRBCS    Sedation by Anesthesia [ x]    PAST MEDICAL & SURGICAL HISTORY:  CAD (coronary artery disease)  Hypercholesterolemia  Hypertension  No significant past surgical history      PMH of Gastroparesis [ ]  Gastric Surgery [ ]  Gastric Outlet Obstruction [ ] no    Allergies:    latex (Rash)  No Known Drug Allergies  shellfish (Hives)    Latex allergy: [x ] yes [ ] no    Medications:MEDICATIONS  (STANDING):  buPROPion SR 200milliGRAM(s) Oral daily  isosorbide   mononitrate ER Tablet (IMDUR) 60milliGRAM(s) Oral daily  ranolazine 500milliGRAM(s) Oral two times a day  diltiazem   CD 180milliGRAM(s) Oral daily  clonazePAM Tablet 0.5milliGRAM(s) Oral two times a day  PARoxetine 30milliGRAM(s) Oral daily  cholecalciferol 1000Unit(s) Oral daily  pantoprazole    Tablet 40milliGRAM(s) Oral before breakfast    MEDICATIONS  (PRN):      Smoking: [ ] yes  [ x] no    AICD/PPM: [ ] yes   [x ] no    Pertinent lab data:                        11.5   6.0   )-----------( 221      ( 2017 06:17 )             34.5     06-14    141  |  105  |  22  ----------------------------<  94  4.3   |  24  |  1.14    Ca    8.9      2017 06:17      PT/INR - ( 2017 06:17 )   PT: 9.9 sec;   INR: 0.92 ratio         PTT - ( 2017 06:17 )  PTT:27.8 sec    Echo:  PROCEDURE: Transthoracic echocardiogram with 2-D, M-Mode  Study Date: 2017    EF (Visual Estimate): 55 %  Conclusions:  1. Mild mitral regurgitation (severity may be  underestimated).  2. Normal left ventricular internal dimensions and wall  thicknesses.  3. Normal left ventricular systolic function. No segmental  wall motion abnormalities.  4. Mild-moderate tricuspid regurgitation.  5. Mild pulmonic regurgitation. Estimated pulmonary artery  systolic pressure equals 42  mm Hg, assuming right atrial  pressure equals 8 mm Hg, consistent with mild pulmonary  pressures.  ------------------------------------------------------------------------    Physical Examination:      Daily Weight in k.4 (2017 05:14)  Vital Signs Last 24 Hrs  T(C): 36.7, Max: 36.8 ( @ 16:40)  T(F): 98, Max: 98.3 ( @ 16:40)  HR: 62 (62 - 82)  BP: 115/60 (102/62 - 115/60)  BP(mean): --  RR: 18 (18 - 18)  SpO2: 97% (96% - 97%)    BP:                 HR:                  SPO2:               Temperature:    Constitutional: NAD    HEENT: Atraumatic      Neck:  No JVD    Respiratory: CTAB/L    Cardiovascular: S1 and S2    Gastrointestinal: BS+, soft, NT/ND    Extremities: No peripheral edema    Comments:    ASA Class: I [ ]  II [ ]  III [x ]  IV [ ]    The patient is a suitable candidate for the planned procedure unless box checked [ ]  No, explain:

## 2017-06-14 NOTE — PROGRESS NOTE ADULT - SUBJECTIVE AND OBJECTIVE BOX
INTERVAL HPI/OVERNIGHT EVENTS: Waiting for EGD and Ok to have cath if needed.   Vital Signs Last 24 Hrs  T(C): 36.6, Max: 36.8 (06-13 @ 16:40)  T(F): 97.8, Max: 98.3 (06-13 @ 16:40)  HR: 67 (67 - 82)  BP: 110/68 (96/59 - 110/68)  BP(mean): --  RR: 18 (18 - 18)  SpO2: 97% (96% - 97%)  I&O's Summary    I & Os for current day (as of 14 Jun 2017 11:05)  =============================================  IN: 620 ml / OUT: 0 ml / NET: 620 ml    MEDICATIONS  (STANDING):  buPROPion SR 200milliGRAM(s) Oral daily  isosorbide   mononitrate ER Tablet (IMDUR) 60milliGRAM(s) Oral daily  ranolazine 500milliGRAM(s) Oral two times a day  diltiazem   CD 180milliGRAM(s) Oral daily  clonazePAM Tablet 0.5milliGRAM(s) Oral two times a day  PARoxetine 30milliGRAM(s) Oral daily  cholecalciferol 1000Unit(s) Oral daily  pantoprazole    Tablet 40milliGRAM(s) Oral before breakfast    MEDICATIONS  (PRN):    LABS:                        11.5   6.0   )-----------( 221      ( 14 Jun 2017 06:17 )             34.5     06-14    141  |  105  |  22  ----------------------------<  94  4.3   |  24  |  1.14    Ca    8.9      14 Jun 2017 06:17      PT/INR - ( 14 Jun 2017 06:17 )   PT: 9.9 sec;   INR: 0.92 ratio         PTT - ( 14 Jun 2017 06:17 )  PTT:27.8 sec    CAPILLARY BLOOD GLUCOSE          REVIEW OF SYSTEMS:  CONSTITUTIONAL: No fever, weight loss, or fatigue  EYES: No eye pain, visual disturbances, or discharge  ENMT:  No difficulty hearing, tinnitus, vertigo; No sinus or throat pain  NECK: No pain or stiffness  BREASTS: No pain, masses, or nipple discharge  RESPIRATORY: No cough, wheezing, chills or hemoptysis; No shortness of breath  CARDIOVASCULAR: No chest pain, palpitations, dizziness, or leg swelling  GASTROINTESTINAL: No abdominal or epigastric pain. No nausea, vomiting, or hematemesis; No diarrhea or constipation. No melena or hematochezia.  GENITOURINARY: No dysuria, frequency, hematuria, or incontinence  NEUROLOGICAL: No headaches, memory loss, loss of strength, numbness, or tremors  SKIN: No itching, burning, rashes, or lesions   LYMPH NODES: No enlarged glands  ENDOCRINE: No heat or cold intolerance; No hair loss  MUSCULOSKELETAL: No joint pain or swelling; No muscle, back, or extremity pain  PSYCHIATRIC: No depression, anxiety, mood swings, or difficulty sleeping  HEME/LYMPH: No easy bruising, or bleeding gums  ALLERY AND IMMUNOLOGIC: No hives or eczema    RADIOLOGY & ADDITIONAL TESTS:    Imaging Personally Reviewed:  [ ] YES  [ ] NO    Consultant(s) Notes Reviewed:  [x ] YES  [ ] NO    PHYSICAL EXAM:  GENERAL: NAD, well-groomed, well-developed  HEAD:  Atraumatic, Normocephalic  EYES: EOMI, PERRLA, conjunctiva and sclera clear  ENMT: No tonsillar erythema, exudates, or enlargement; Moist mucous membranes, Good dentition, No lesions  NECK: Supple, No JVD, Normal thyroid  NERVOUS SYSTEM:  Alert & Oriented X3, Good concentration; Motor Strength 5/5 B/L upper and lower extremities; DTRs 2+ intact and symmetric  CHEST/LUNG: Clear to percussion bilaterally; No rales, rhonchi, wheezing, or rubs  HEART: Regular rate and rhythm; No murmurs, rubs, or gallops  ABDOMEN: Soft, Nontender, Nondistended; Bowel sounds present  EXTREMITIES:  2+ Peripheral Pulses, No clubbing, cyanosis, or edema  LYMPH: No lymphadenopathy noted  SKIN: No rashes or lesions    Care Discussed with Consultants/Other Providers [ x] YES  [ ] NO

## 2017-06-15 ENCOUNTER — TRANSCRIPTION ENCOUNTER (OUTPATIENT)
Age: 67
End: 2017-06-15

## 2017-06-15 LAB
ANION GAP SERPL CALC-SCNC: 14 MMOL/L — SIGNIFICANT CHANGE UP (ref 5–17)
BUN SERPL-MCNC: 20 MG/DL — SIGNIFICANT CHANGE UP (ref 7–23)
CALCIUM SERPL-MCNC: 8.6 MG/DL — SIGNIFICANT CHANGE UP (ref 8.4–10.5)
CHLORIDE SERPL-SCNC: 107 MMOL/L — SIGNIFICANT CHANGE UP (ref 96–108)
CO2 SERPL-SCNC: 24 MMOL/L — SIGNIFICANT CHANGE UP (ref 22–31)
CREAT SERPL-MCNC: 1.15 MG/DL — SIGNIFICANT CHANGE UP (ref 0.5–1.3)
CULTURE RESULTS: SIGNIFICANT CHANGE UP
CULTURE RESULTS: SIGNIFICANT CHANGE UP
GLUCOSE SERPL-MCNC: 131 MG/DL — HIGH (ref 70–99)
HCT VFR BLD CALC: 35.2 % — SIGNIFICANT CHANGE UP (ref 34.5–45)
HGB BLD-MCNC: 11.6 G/DL — SIGNIFICANT CHANGE UP (ref 11.5–15.5)
MCHC RBC-ENTMCNC: 31.6 PG — SIGNIFICANT CHANGE UP (ref 27–34)
MCHC RBC-ENTMCNC: 32.9 GM/DL — SIGNIFICANT CHANGE UP (ref 32–36)
MCV RBC AUTO: 96.2 FL — SIGNIFICANT CHANGE UP (ref 80–100)
PLATELET # BLD AUTO: 231 K/UL — SIGNIFICANT CHANGE UP (ref 150–400)
POTASSIUM SERPL-MCNC: 4.2 MMOL/L — SIGNIFICANT CHANGE UP (ref 3.5–5.3)
POTASSIUM SERPL-SCNC: 4.2 MMOL/L — SIGNIFICANT CHANGE UP (ref 3.5–5.3)
RBC # BLD: 3.66 M/UL — LOW (ref 3.8–5.2)
RBC # FLD: 14.3 % — SIGNIFICANT CHANGE UP (ref 10.3–14.5)
SODIUM SERPL-SCNC: 145 MMOL/L — SIGNIFICANT CHANGE UP (ref 135–145)
SPECIMEN SOURCE: SIGNIFICANT CHANGE UP
SPECIMEN SOURCE: SIGNIFICANT CHANGE UP
SURGICAL PATHOLOGY STUDY: SIGNIFICANT CHANGE UP
WBC # BLD: 5.5 K/UL — SIGNIFICANT CHANGE UP (ref 3.8–10.5)
WBC # FLD AUTO: 5.5 K/UL — SIGNIFICANT CHANGE UP (ref 3.8–10.5)

## 2017-06-15 RX ORDER — ASPIRIN/CALCIUM CARB/MAGNESIUM 324 MG
1 TABLET ORAL
Qty: 0 | Refills: 0 | COMMUNITY

## 2017-06-15 RX ORDER — DOCUSATE SODIUM 100 MG
100 CAPSULE ORAL
Qty: 0 | Refills: 0 | Status: DISCONTINUED | OUTPATIENT
Start: 2017-06-15 | End: 2017-06-16

## 2017-06-15 RX ADMIN — RANOLAZINE 500 MILLIGRAM(S): 500 TABLET, FILM COATED, EXTENDED RELEASE ORAL at 17:11

## 2017-06-15 RX ADMIN — BUPROPION HYDROCHLORIDE 200 MILLIGRAM(S): 150 TABLET, EXTENDED RELEASE ORAL at 08:40

## 2017-06-15 RX ADMIN — Medication 100 MILLIGRAM(S): at 22:58

## 2017-06-15 RX ADMIN — PANTOPRAZOLE SODIUM 40 MILLIGRAM(S): 20 TABLET, DELAYED RELEASE ORAL at 05:32

## 2017-06-15 RX ADMIN — Medication 0.5 MILLIGRAM(S): at 17:11

## 2017-06-15 RX ADMIN — ISOSORBIDE MONONITRATE 60 MILLIGRAM(S): 60 TABLET, EXTENDED RELEASE ORAL at 08:40

## 2017-06-15 RX ADMIN — Medication 0.5 MILLIGRAM(S): at 05:33

## 2017-06-15 RX ADMIN — RANOLAZINE 500 MILLIGRAM(S): 500 TABLET, FILM COATED, EXTENDED RELEASE ORAL at 05:32

## 2017-06-15 RX ADMIN — Medication 1000 UNIT(S): at 08:40

## 2017-06-15 RX ADMIN — PANTOPRAZOLE SODIUM 40 MILLIGRAM(S): 20 TABLET, DELAYED RELEASE ORAL at 17:11

## 2017-06-15 RX ADMIN — Medication 180 MILLIGRAM(S): at 05:32

## 2017-06-15 RX ADMIN — Medication 30 MILLIGRAM(S): at 08:40

## 2017-06-15 NOTE — DISCHARGE NOTE ADULT - CARE PROVIDER_API CALL
Antony Rahman (MD), Cardiovascular Disease  [address] [facility] [address]  [place] [address]  Phone: [phone]  Fax: [phone] Antony Rahman (MD), Cardiovascular Disease  [address] [facility] [address]  [place] [address]  Phone: [phone]  Fax: [phone]    Patrice Ricks (DO), Internal Medicine  [address] [place] [address]  Phone: [phone]  Fax: [phone]

## 2017-06-15 NOTE — PROGRESS NOTE ADULT - SUBJECTIVE AND OBJECTIVE BOX
Patient is a 67y old  Female who presents with a chief complaint of     SUBJECTIVE / OVERNIGHT EVENTS: No nausea, vomiting or diarrhea, no fever or chills, no dizziness or chest pain, no dysuria or hematuria, no jt pain or swelling  No confusion eating well    MEDICATIONS  (STANDING):  buPROPion SR 200milliGRAM(s) Oral daily  isosorbide   mononitrate ER Tablet (IMDUR) 60milliGRAM(s) Oral daily  ranolazine 500milliGRAM(s) Oral two times a day  diltiazem   CD 180milliGRAM(s) Oral daily  clonazePAM Tablet 0.5milliGRAM(s) Oral two times a day  PARoxetine 30milliGRAM(s) Oral daily  cholecalciferol 1000Unit(s) Oral daily  pantoprazole    Tablet 40milliGRAM(s) Oral two times a day before meals    MEDICATIONS  (PRN):      Vital Signs Last 24 Hrs  T(C): 36.5, Max: 36.5 (06-15 @ 04:56)  HR: 70 (63 - 70)  BP: 108/63 (90/56 - 144/76)  RR: 18 (18 - 18)  SpO2: 98% (97% - 98%)  Wt(kg): --  CAPILLARY BLOOD GLUCOSE    I&O's Summary  I & Os for 24h ending 15 Jonny 2017 07:00  =============================================  IN: 120 ml / OUT: 0 ml / NET: 120 ml    I & Os for current day (as of 15 Jonny 2017 15:58)  =============================================  IN: 300 ml / OUT: 0 ml / NET: 300 ml      PHYSICAL EXAM:    GENERAL: NAD, well-developed  HEAD:  Atraumatic, Normocephalic  EYES: EOMI, PERRLA, conjunctiva and sclera clear mild pallor  NECK: Supple, No JVD  CHEST/LUNG: Clear to auscultation bilaterally; No wheeze  HEART: Regular rate and rhythm; No murmurs, rubs, or gallops  ABDOMEN: Soft, Nontender, Nondistended; Bowel sounds present  EXTREMITIES:  2+ Peripheral Pulses, No clubbing, cyanosis, or edema  PSYCH: AAOx3  NEUROLOGY: non-focal  SKIN: No rashes or lesions    LABS:                        11.6   5.5   )-----------( 231      ( 15 Jonny 2017 06:42 )             35.2     06-15    145  |  107  |  20  ----------------------------<  131<H>  4.2   |  24  |  1.15    Ca    8.6      15 Jonny 2017 06:42      PT/INR - ( 14 Jun 2017 06:17 )   PT: 9.9 sec;   INR: 0.92 ratio         PTT - ( 14 Jun 2017 06:17 )  PTT:27.8 sec            Consultant(s) Notes Reviewed:      Care Discussed with Consultants/Other Providers:    Contact Number, Dr Rose [phone]

## 2017-06-15 NOTE — DISCHARGE NOTE ADULT - CARE PLAN
Principal Discharge DX:	Syncope  Goal:	Remain free of symptoms  Instructions for follow-up, activity and diet:	Take medications as prescribed  Follow up with your primary care provider  Secondary Diagnosis:	CAD (coronary artery disease)  Instructions for follow-up, activity and diet:	Coronary artery disease is a condition where the arteries the supply the heart muscle get clogges with fatty deposits & puts you at risk for a heart attack  Call your doctor if you have any new pain, pressure, or discomfort in the center of your chest, pain, tingling or discomfort in arms, back, neck, jaw, or stomach, shortness of breath, nausea, vomiting, burping or heartburn, sweating, cold and clammy skin, racing or abnormal heartbeat for more than 10 minutes or if they keep coming & going.  Call 911 and do not tr to get to hospital by care  You can help yourself with lefestyle changes (quitting smoking if you smoke), eat lots of fruits & vegetables & low fat dairy products, not a lot of meat & fatty foods, walk or some form of physical activity most days of the week, lose weight if you are overweight  Take your cardiac medication as prescribed to lower cholesterol, to lower blood pressure, aspirin to prevent blood clots, and diabetes control  Make sure to keep appointments with doctor for cardiac follow up care Principal Discharge DX:	Syncope  Goal:	Remain free of symptoms  Instructions for follow-up, activity and diet:	Take medications as prescribed  Follow up with your primary care provider  Secondary Diagnosis:	CAD (coronary artery disease)  Goal:	No further episode of chest pain will occur.  Instructions for follow-up, activity and diet:	Coronary artery disease is a condition where the arteries the supply the heart muscle get clogges with fatty deposits & puts you at risk for a heart attack  Call your doctor if you have any new pain, pressure, or discomfort in the center of your chest, pain, tingling or discomfort in arms, back, neck, jaw, or stomach, shortness of breath, nausea, vomiting, burping or heartburn, sweating, cold and clammy skin, racing or abnormal heartbeat for more than 10 minutes or if they keep coming & going.  Call 911 and do not tr to get to hospital by care  You can help yourself with lefestyle changes (quitting smoking if you smoke), eat lots of fruits & vegetables & low fat dairy products, not a lot of meat & fatty foods, walk or some form of physical activity most days of the week, lose weight if you are overweight  Take your cardiac medication as prescribed to lower cholesterol, to lower blood pressure, aspirin to prevent blood clots, and diabetes control  Make sure to keep appointments with doctor for cardiac follow up care

## 2017-06-15 NOTE — PROGRESS NOTE ADULT - SUBJECTIVE AND OBJECTIVE BOX
Subjective: pt seen and examined , no chest pain , ROS -.    buPROPion SR 200milliGRAM(s) Oral daily  isosorbide   mononitrate ER Tablet (IMDUR) 60milliGRAM(s) Oral daily  ranolazine 500milliGRAM(s) Oral two times a day  diltiazem   CD 180milliGRAM(s) Oral daily  clonazePAM Tablet 0.5milliGRAM(s) Oral two times a day  PARoxetine 30milliGRAM(s) Oral daily  cholecalciferol 1000Unit(s) Oral daily  pantoprazole    Tablet 40milliGRAM(s) Oral two times a day before meals                            11.5   6.0   )-----------( 221      ( 2017 06:17 )             34.5       Hemoglobin: 11.5 g/dL ( @ 06:17)  Hemoglobin: 12.0 g/dL ( @ 21:53)  Hemoglobin: 7.6 g/dL ( @ 06:59)  Hemoglobin: 8.4 g/dL ( @ 07:13)  Hemoglobin: 8.1 g/dL ( @ 08:26)          141  |  105  |  22  ----------------------------<  94  4.3   |  24  |  1.14    Ca    8.9      2017 06:17      Creatinine Trend: 1.14<--, 1.16<--, 1.05<--, 1.08<--    COAGS:       T(C): 36.3, Max: 36.7 ( @ 12:09)  HR: 63 (62 - 67)  BP: 144/76 (110/68 - 144/76)  RR: 18 (18 - 18)  SpO2: 97% (97% - 97%)  Wt(kg): --    I&O's Summary  I & Os for 24h ending 2017 07:00  =============================================  IN: 620 ml / OUT: 0 ml / NET: 620 ml    I & Os for current day (as of 15 Jonny 2017 04:40)  =============================================  IN: 120 ml / OUT: 0 ml / NET: 120 ml      Daily     Daily Weight in k.4 (2017 05:14)    Appearance: Normal	  HEENT:   Normal oral mucosa, PERRL, EOMI	  Lymphatic: No lymphadenopathy , no edema  Cardiovascular: Normal S1 S2, No JVD, No murmurs , Peripheral pulses palpable 2+ bilaterally  Respiratory: Lungs clear to auscultation, normal effort 	  Gastrointestinal:  Soft, Non-tender, + BS	  Skin: No rashes, No ecchymoses, No cyanosis, warm to touch  Musculoskeletal: Normal range of motion, normal strength  Psychiatry:  Mood & affect appropriate    TELEMETRY: 	  NSR  ECG:  	    RADIOLOGY:   DIAGNOSTIC TESTING:  [x ] Echocardiogram:   Conclusions:  1. Mild mitral regurgitation (severity may be  underestimated).  2. Normal left ventricular internal dimensions and wall  thicknesses.  3. Normal left ventricular systolic function. No segmental  wall motion abnormalities.  4. Mild-moderate tricuspid regurgitation.  5. Mild pulmonic regurgitation. Estimated pulmonary artery  systolic pressure equals 42  mm Hg, assuming right atrial  pressure equals 8 mm Hg, consistent with mild pulmonary  pressures.  ------------------------------------------------------------------------  Confirmed on  2017 - 14:55:54 by Pradip Clarke M.D.  ------------------------------------------------------------------------    [ ]  Catheterization:  [ ] Stress Test:    OTHER:   Upper endoscopy:                              Impression:          - Normal esophagus.                       - Non-bleeding gastric ulcers. Biopsied.                       - Gastritis. Biopsied.                       - Normal examined duodenum.  Recommendation:      - [facility].                       - Resume regular diet.                       - Await pathology                       - Gastrin level                       - Protonix 40mg PO BID                       - Repeat EGD in 8 weeks      ASSESSMENT/PLAN: 	67y Female hx of HTN,CAD, Chol, spinal sx , now syncope, LOC, anemia , s/p PRBC.    -contact primary Cardiologist to obtain outpatient records  -cont tele  -cont trend h/h  -cont Cardizem , isosorbide, ASA, Ranexa   cont GI / DVT prophylaxis.  cont keep K>4, mag >2.0   hold all Antiplt /A/C at present.   GI follow up   cardiac marker negative x 3 sets.  The patient has been and continues to refuse cardiac catheterization despite knowing the risks which include but not limited to death/MI   d/c  planning per 1 team,   D/W Dr Rahman

## 2017-06-15 NOTE — PROGRESS NOTE ADULT - SUBJECTIVE AND OBJECTIVE BOX
INTERVAL HPI/OVERNIGHT EVENTS:  Pt S/E -- without abdominal pain / nausea - tolerating diet well   BM x 1 -- "black" stool  S/P EGD yesterday     MEDICATIONS  (STANDING):  buPROPion SR 200milliGRAM(s) Oral daily  isosorbide   mononitrate ER Tablet (IMDUR) 60milliGRAM(s) Oral daily  ranolazine 500milliGRAM(s) Oral two times a day  diltiazem   CD 180milliGRAM(s) Oral daily  clonazePAM Tablet 0.5milliGRAM(s) Oral two times a day  PARoxetine 30milliGRAM(s) Oral daily  cholecalciferol 1000Unit(s) Oral daily  pantoprazole    Tablet 40milliGRAM(s) Oral two times a day before meals    Allergies    latex (Rash)  No Known Drug Allergies  shellfish (Hives)      ROS:   General:  No wt loss, fevers, chills, night sweats, fatigue,   Eyes:  Good vision, no reported pain  ENT:  No sore throat, pain, runny nose, dysphagia  CV:  No pain, palpitations, hypo/hypertension  Resp:  No dyspnea, cough, tachypnea, wheezing  GI:  No pain, No nausea, No vomiting, No diarrhea, No constipation, No weight loss, No fever, No pruritis, No rectal bleeding No dysphagia,  :  No pain, bleeding, incontinence, nocturia  Muscle:  No pain, weakness  Neuro:  No weakness, tingling, memory problems  Psych:  No fatigue, insomnia, mood problems, depression  Endocrine:  No polyuria, polydipsia, cold/heat intolerance  Heme:  No petechiae, ecchymosis, easy bruisability  Skin:  No rash, tattoos, scars, edema      PHYSICAL EXAM:   Vital Signs:   Vital Signs Last 24 Hrs  T(C): 36.5, Max: 36.7 (-14 @ 12:09)  T(F): 97.7, Max: 98 (-14 @ 12:09)  HR: 70 (62 - 70)  BP: 108/63 (90/56 - 144/76)  BP(mean): --  RR: 18 (18 - 18)  SpO2: 98% (97% - 98%)  Daily     Daily Weight in k.5 (15 Jonny 2017 04:56)      GENERAL:  Resting in bed comfortably in NAD   HEENT:  NC/AT,  conjunctivae clear and pink, no thyromegaly, nodules, adenopathy, no JVD, sclera -anicteric  CHEST:  Full & symmetric excursion, no increased effort, breath sounds clear  HEART:  Regular rhythm, S1, S2, no murmur/rub/S3/S4, no abdominal bruit, no edema  ABDOMEN:  Soft, non-tender, non-distended, normoactive bowel sounds,  no masses ,no hepato-splenomegaly, no signs of chronic liver disease  EXTEREMITIES:  no cyanosis,clubbing or edema  SKIN:  No rash/erythema/ecchymoses/petechiae/wounds/abscess/warm/dry  NEURO:  Alert, oriented, no asterixis, no tremor, no encephalopathy      LABS:                        11.6   5.5   )-----------( 231      ( 15 Jonny 2017 06:42 )             35.2     06-15    145  |  107  |  20  ----------------------------<  131<H>  4.2   |  24  |  1.15    Ca    8.6      15 Jonny 2017 06:42      PT/INR - ( 2017 06:17 )   PT: 9.9 sec;   INR: 0.92 ratio         PTT - ( 2017 06:17 )  PTT:27.8 sec        RADIOLOGY & ADDITIONAL TESTS:    Upper endoscopy 17:    Findings:      The examined esophagus was normal.       Few non-bleeding cratered gastric ulcers were found in the gastric antrum. The largest lesion        was 10 mm in largest dimension. Biopsies were taken with a cold forceps for histology.       Moderate inflammation was found in the gastric antrum. Biopsies were taken with a cold        forceps for histology. Estimated blood loss: none.       The examined duodenum was normal.                           Impression:     - Normal esophagus.                       - Non-bleeding gastric ulcers. Biopsied.                       - Gastritis. Biopsied.                       - Normal examined duodenum.  Recommendation:      - [facility].                       - Resume regular diet.                       - Await pathology                       - Gastrin level                       - Protonix 40mg PO BID                       - Repeat EGD in 8 weeks

## 2017-06-15 NOTE — PROGRESS NOTE ADULT - PROBLEM SELECTOR PLAN 1
S/P EGD yesterday w/ gastric ulcers, gastritis  - Monitor H/H and cbc - transfuse prn  - Pathology from EGD pending  - Gastrin level pending  - PPI 40 mg  po bid   - Diet as tolerated  - Repeat EGD In 8 weeks

## 2017-06-15 NOTE — DISCHARGE NOTE ADULT - PLAN OF CARE
Remain free of symptoms Coronary artery disease is a condition where the arteries the supply the heart muscle get clogges with fatty deposits & puts you at risk for a heart attack  Call your doctor if you have any new pain, pressure, or discomfort in the center of your chest, pain, tingling or discomfort in arms, back, neck, jaw, or stomach, shortness of breath, nausea, vomiting, burping or heartburn, sweating, cold and clammy skin, racing or abnormal heartbeat for more than 10 minutes or if they keep coming & going.  Call 911 and do not tr to get to hospital by care  You can help yourself with lefestyle changes (quitting smoking if you smoke), eat lots of fruits & vegetables & low fat dairy products, not a lot of meat & fatty foods, walk or some form of physical activity most days of the week, lose weight if you are overweight  Take your cardiac medication as prescribed to lower cholesterol, to lower blood pressure, aspirin to prevent blood clots, and diabetes control  Make sure to keep appointments with doctor for cardiac follow up care Take medications as prescribed  Follow up with your primary care provider No further episode of chest pain will occur.

## 2017-06-15 NOTE — PROGRESS NOTE ADULT - PROBLEM SELECTOR PLAN 1
will repeat stress test inpatient to assess need for eventual angiogram  if negative may not need any intervention  Pt reluctant anyway for angiogram

## 2017-06-15 NOTE — DISCHARGE NOTE ADULT - PATIENT PORTAL LINK FT
“You can access the [facility] Patient Portal, offered by [facility], by registering with the following website: [email or web address]”

## 2017-06-15 NOTE — DISCHARGE NOTE ADULT - HOSPITAL COURSE
MD To be done by Attending. 68 y/o F with CAD, HTN was in her usual state of health when she was called by her daughter for breakfast after which she doesn’t remember anything. She states daughter found her on the floor and apparently she did not hurt herself. She denies any incontinence or seizure like activity, no chest pain or aura preceding. She does note 2 days of right occipital headache, similar to her usual headache.  Had mild nausea but no abdominal pain  Dx: Syncope        +Orthostatic Hypotension        HA         Anemia  6/10  S/p NS IV x 2 Boluses, 1st Trop wnl, Protonix 80mg IV x 1 dose given, FOBT- negative             per ED, Echo pending, GI and Cards consults called by attending   6/11	Scheduled for EGD on Tuesday.  6/12 possible for EGD need cardiology clearance   GI Eval guaiac positive  EGD today  Hb dropped to < 8 today    6/13 s/p 2unit prbc  tomorrow for EGD Npo after midnight   6/14 - EGD: Impression: - Normal esophagus.                       - Non-bleeding gastric ulcers. Biopsied.                       - Gastritis. Biopsied.                       - Normal examined duodenum.  Recommendation:      - [facility].                       - Resume regular diet.                       - Await pathology                       - Gastrin level                       - Protonix 40mg PO BID                       - Repeat EGD in 8 weeks  6/15- plan for stress today, if negative DCP    Stress pos for very small reversible defect apical, inferoapical  cleared by cardiology for DC home and outpatient follow up (feel its doubtful that patient needs a cath for this stress result, rec medical management

## 2017-06-15 NOTE — DISCHARGE NOTE ADULT - MEDICATION SUMMARY - MEDICATIONS TO TAKE
I will START or STAY ON the medications listed below when I get home from the hospital:    isosorbide mononitrate 60 mg oral tablet, extended release  -- 1 tab(s) by mouth once a day (in the morning)  -- Indication: For CAD (coronary artery disease)    Ranexa 500 mg oral tablet, extended release  -- 1 tab(s) by mouth 2 times a day  -- Indication: For CAD (coronary artery disease)    DilTIAZem Hydrochloride  mg/24 hours oral capsule, extended release  -- 1 cap(s) by mouth once a day (in the morning)  -- Indication: For CAD (coronary artery disease)    clonazePAM 0.5 mg oral tablet  -- 1 tab(s) by mouth 2 times a day  -- Indication: For Anxiety    PARoxetine 30 mg oral tablet  -- 1 tab(s) by mouth once a day (in the evening)  -- Indication: For Depression     omeprazole 40 mg oral delayed release capsule  -- 1 cap(s) by mouth once a day  -- Indication: For GERD    Wellbutrin  mg/12 hours oral tablet, extended release  -- 1 tab(s) by mouth once a day (in the morning)  -- Indication: For Depression     Vitamin D3 1000 intl units oral tablet  -- 1 tab(s) by mouth once a day (in the evening)  -- Indication: For Supplement

## 2017-06-16 VITALS
OXYGEN SATURATION: 95 % | DIASTOLIC BLOOD PRESSURE: 77 MMHG | TEMPERATURE: 98 F | HEART RATE: 70 BPM | SYSTOLIC BLOOD PRESSURE: 125 MMHG | RESPIRATION RATE: 18 BRPM

## 2017-06-16 LAB
ANION GAP SERPL CALC-SCNC: 12 MMOL/L — SIGNIFICANT CHANGE UP (ref 5–17)
BUN SERPL-MCNC: 19 MG/DL — SIGNIFICANT CHANGE UP (ref 7–23)
CALCIUM SERPL-MCNC: 9.1 MG/DL — SIGNIFICANT CHANGE UP (ref 8.4–10.5)
CHLORIDE SERPL-SCNC: 106 MMOL/L — SIGNIFICANT CHANGE UP (ref 96–108)
CO2 SERPL-SCNC: 25 MMOL/L — SIGNIFICANT CHANGE UP (ref 22–31)
CREAT SERPL-MCNC: 1.1 MG/DL — SIGNIFICANT CHANGE UP (ref 0.5–1.3)
GASTRIN SERPL-MCNC: 80 PG/ML — SIGNIFICANT CHANGE UP
GLUCOSE SERPL-MCNC: 102 MG/DL — HIGH (ref 70–99)
HCT VFR BLD CALC: 36.6 % — SIGNIFICANT CHANGE UP (ref 34.5–45)
HGB BLD-MCNC: 11.6 G/DL — SIGNIFICANT CHANGE UP (ref 11.5–15.5)
MCHC RBC-ENTMCNC: 30.6 PG — SIGNIFICANT CHANGE UP (ref 27–34)
MCHC RBC-ENTMCNC: 31.7 GM/DL — LOW (ref 32–36)
MCV RBC AUTO: 96.4 FL — SIGNIFICANT CHANGE UP (ref 80–100)
PLATELET # BLD AUTO: 243 K/UL — SIGNIFICANT CHANGE UP (ref 150–400)
POTASSIUM SERPL-MCNC: 4.2 MMOL/L — SIGNIFICANT CHANGE UP (ref 3.5–5.3)
POTASSIUM SERPL-SCNC: 4.2 MMOL/L — SIGNIFICANT CHANGE UP (ref 3.5–5.3)
RBC # BLD: 3.79 M/UL — LOW (ref 3.8–5.2)
RBC # FLD: 14.5 % — SIGNIFICANT CHANGE UP (ref 10.3–14.5)
SODIUM SERPL-SCNC: 143 MMOL/L — SIGNIFICANT CHANGE UP (ref 135–145)
WBC # BLD: 5.5 K/UL — SIGNIFICANT CHANGE UP (ref 3.8–10.5)
WBC # FLD AUTO: 5.5 K/UL — SIGNIFICANT CHANGE UP (ref 3.8–10.5)

## 2017-06-16 PROCEDURE — 71275 CT ANGIOGRAPHY CHEST: CPT

## 2017-06-16 PROCEDURE — 96375 TX/PRO/DX INJ NEW DRUG ADDON: CPT | Mod: XU

## 2017-06-16 PROCEDURE — 80048 BASIC METABOLIC PNL TOTAL CA: CPT

## 2017-06-16 PROCEDURE — 99285 EMERGENCY DEPT VISIT HI MDM: CPT | Mod: 25

## 2017-06-16 PROCEDURE — 82550 ASSAY OF CK (CPK): CPT

## 2017-06-16 PROCEDURE — P9016: CPT

## 2017-06-16 PROCEDURE — 85027 COMPLETE CBC AUTOMATED: CPT

## 2017-06-16 PROCEDURE — 83550 IRON BINDING TEST: CPT

## 2017-06-16 PROCEDURE — 88312 SPECIAL STAINS GROUP 1: CPT

## 2017-06-16 PROCEDURE — 88305 TISSUE EXAM BY PATHOLOGIST: CPT

## 2017-06-16 PROCEDURE — 87040 BLOOD CULTURE FOR BACTERIA: CPT

## 2017-06-16 PROCEDURE — 85730 THROMBOPLASTIN TIME PARTIAL: CPT

## 2017-06-16 PROCEDURE — 82553 CREATINE MB FRACTION: CPT

## 2017-06-16 PROCEDURE — 80053 COMPREHEN METABOLIC PANEL: CPT

## 2017-06-16 PROCEDURE — 78452 HT MUSCLE IMAGE SPECT MULT: CPT | Mod: 26

## 2017-06-16 PROCEDURE — 82728 ASSAY OF FERRITIN: CPT

## 2017-06-16 PROCEDURE — 83690 ASSAY OF LIPASE: CPT

## 2017-06-16 PROCEDURE — 96374 THER/PROPH/DIAG INJ IV PUSH: CPT | Mod: XU

## 2017-06-16 PROCEDURE — 93016 CV STRESS TEST SUPVJ ONLY: CPT

## 2017-06-16 PROCEDURE — 86850 RBC ANTIBODY SCREEN: CPT

## 2017-06-16 PROCEDURE — 85610 PROTHROMBIN TIME: CPT

## 2017-06-16 PROCEDURE — 84484 ASSAY OF TROPONIN QUANT: CPT

## 2017-06-16 PROCEDURE — 81001 URINALYSIS AUTO W/SCOPE: CPT

## 2017-06-16 PROCEDURE — 84443 ASSAY THYROID STIM HORMONE: CPT

## 2017-06-16 PROCEDURE — 83880 ASSAY OF NATRIURETIC PEPTIDE: CPT

## 2017-06-16 PROCEDURE — 93018 CV STRESS TEST I&R ONLY: CPT

## 2017-06-16 PROCEDURE — 86923 COMPATIBILITY TEST ELECTRIC: CPT

## 2017-06-16 PROCEDURE — 36430 TRANSFUSION BLD/BLD COMPNT: CPT

## 2017-06-16 PROCEDURE — 82941 ASSAY OF GASTRIN: CPT

## 2017-06-16 PROCEDURE — 82607 VITAMIN B-12: CPT

## 2017-06-16 PROCEDURE — 86900 BLOOD TYPING SEROLOGIC ABO: CPT

## 2017-06-16 PROCEDURE — 86803 HEPATITIS C AB TEST: CPT

## 2017-06-16 PROCEDURE — 93306 TTE W/DOPPLER COMPLETE: CPT

## 2017-06-16 PROCEDURE — 70450 CT HEAD/BRAIN W/O DYE: CPT

## 2017-06-16 PROCEDURE — 86901 BLOOD TYPING SEROLOGIC RH(D): CPT

## 2017-06-16 PROCEDURE — 82962 GLUCOSE BLOOD TEST: CPT

## 2017-06-16 PROCEDURE — 87086 URINE CULTURE/COLONY COUNT: CPT

## 2017-06-16 PROCEDURE — 78452 HT MUSCLE IMAGE SPECT MULT: CPT

## 2017-06-16 PROCEDURE — 85379 FIBRIN DEGRADATION QUANT: CPT

## 2017-06-16 PROCEDURE — A9500: CPT

## 2017-06-16 PROCEDURE — 93005 ELECTROCARDIOGRAM TRACING: CPT

## 2017-06-16 PROCEDURE — 71045 X-RAY EXAM CHEST 1 VIEW: CPT

## 2017-06-16 PROCEDURE — 82272 OCCULT BLD FECES 1-3 TESTS: CPT

## 2017-06-16 PROCEDURE — 93017 CV STRESS TEST TRACING ONLY: CPT

## 2017-06-16 RX ADMIN — ISOSORBIDE MONONITRATE 60 MILLIGRAM(S): 60 TABLET, EXTENDED RELEASE ORAL at 13:58

## 2017-06-16 RX ADMIN — Medication 30 MILLIGRAM(S): at 13:58

## 2017-06-16 RX ADMIN — Medication 1000 UNIT(S): at 13:58

## 2017-06-16 RX ADMIN — Medication 0.5 MILLIGRAM(S): at 06:27

## 2017-06-16 RX ADMIN — PANTOPRAZOLE SODIUM 40 MILLIGRAM(S): 20 TABLET, DELAYED RELEASE ORAL at 06:27

## 2017-06-16 RX ADMIN — Medication 180 MILLIGRAM(S): at 06:27

## 2017-06-16 RX ADMIN — RANOLAZINE 500 MILLIGRAM(S): 500 TABLET, FILM COATED, EXTENDED RELEASE ORAL at 06:27

## 2017-06-16 RX ADMIN — BUPROPION HYDROCHLORIDE 200 MILLIGRAM(S): 150 TABLET, EXTENDED RELEASE ORAL at 13:58

## 2017-06-16 RX ADMIN — Medication 100 MILLIGRAM(S): at 06:27

## 2017-06-16 NOTE — PROGRESS NOTE ADULT - PROBLEM SELECTOR PROBLEM 1
Anemia, unspecified type
Syncope
Anemia, unspecified type
CAD (coronary artery disease)
Syncope

## 2017-06-16 NOTE — PROGRESS NOTE ADULT - SUBJECTIVE AND OBJECTIVE BOX
INTERVAL HPI/OVERNIGHT EVENTS:  Pt S/E -- without abdominal complaints  Tolerating Diet  No BRBPR    MEDICATIONS  (STANDING):  buPROPion SR 200milliGRAM(s) Oral daily  isosorbide   mononitrate ER Tablet (IMDUR) 60milliGRAM(s) Oral daily  ranolazine 500milliGRAM(s) Oral two times a day  diltiazem   CD 180milliGRAM(s) Oral daily  clonazePAM Tablet 0.5milliGRAM(s) Oral two times a day  PARoxetine 30milliGRAM(s) Oral daily  cholecalciferol 1000Unit(s) Oral daily  pantoprazole    Tablet 40milliGRAM(s) Oral two times a day before meals  docusate sodium 100milliGRAM(s) Oral two times a day    MEDICATIONS  (PRN):  ----    Allergies    latex (Rash)  No Known Drug Allergies  shellfish (Hives)    Intolerances: ----    ROS:   General:  No wt loss, fevers, chills, night sweats  Eyes:  Good vision, no reported pain  ENT:  No sore throat, pain, runny nose, dysphagia  CV:  No pain, palpitations, hypo/hypertension  Resp:  No dyspnea, cough, tachypnea, wheezing  GI:  No pain, No nausea, No vomiting, No diarrhea, No constipation, No weight loss, No fever, No pruritis, No rectal bleeding,  No dysphagia,  :  No pain, bleeding, incontinence, nocturia  Muscle:  Mild weakness, no pain   Neuro:  No weakness, tingling, memory problems  Psych:  No fatigue, insomnia, mood problems, depression  Endocrine:  No polyuria, polydipsia, cold/heat intolerance  Heme:  No petechiae, ecchymosis, easy bruisability  Skin:  No rash, tattoos, scars, edema      PHYSICAL EXAM:   Vital Signs:  Vital Signs Last 24 Hrs  T(C): 36.4, Max: 36.7 (06-15 @ 21:43)  T(F): 97.6, Max: 98 (06-15 @ 21:43)  HR: 70 (70 - 85)  BP: 125/77 (112/70 - 125/77)  BP(mean): --  RR: 18 (18 - 18)  SpO2: 95% (95% - 96%)  Daily     Daily Weight in k.9 (2017 04:43)    GENERAL:  Appears stated age, well-groomed, well-nourished, no distress  HEENT:  NC/AT,  conjunctivae clear and pink, no thyromegaly, nodules, adenopathy, no JVD, sclera -anicteric  CHEST:  Full & symmetric excursion, no increased effort, breath sounds clear  HEART:  Regular rhythm, S1, S2, no murmur/rub/S3/S4, no abdominal bruit, no edema  ABDOMEN:  Soft, non-tender, non-distended, normoactive bowel sounds,  no masses ,no hepato-splenomegaly, no signs of chronic liver disease  EXTEREMITIES:  no cyanosis,clubbing or edema  SKIN:  No rash/erythema/ecchymoses/petechiae/wounds/abscess/warm/dry  NEURO:  Alert, oriented, no asterixis, no tremor, no encephalopathy      LABS:                        11.6   5.5   )-----------( 243      ( 2017 06:51 )             36.6     06-16    143  |  106  |  19  ----------------------------<  102<H>  4.2   |  25  |  1.10    Ca    9.1      2017 06:51            RADIOLOGY & ADDITIONAL TESTS:

## 2017-06-16 NOTE — PROGRESS NOTE ADULT - PROBLEM SELECTOR PROBLEM 5
CAD (coronary artery disease)
Hypercholesterolemia

## 2017-06-16 NOTE — PROGRESS NOTE ADULT - ATTENDING COMMENTS
Agree with above.   -Check TTE  -Obtain old records from primary cardiologist office  -GI follow up  -Further workup pending above.
CARDIOLOGY ATTENDING    Agree with above. Stable CAD with normal LVEF being treated with optimal medical therapy. Off DAPT due to GI bleeding. Follow up NST. If NST unremarkable then no further cardiac workup needed. Restart ASA 81 if/when ok with GI.
Patient seen and examined, agree with above assessment and plan as transcribed above.    - D/W GI, multiple ulcers high risk for GI bleed, would avoid dual antiplatelet agents  - Cont med management of chronic stable CAD  - No cliniclal CHF    Antony Rahman MD, FACC  [facility], [facility]  [address] [place] [address]  PHONE:  [phone]  BEEPER : [phone]
Patient seen and examined, agree with above assessment and plan as transcribed above.  - F/U EGD  - No pertinent findings on Tele or Echo    Antony Rahman MD, FACC  [facility], [facility]  [address] [place] [address]  PHONE:  [phone]  BEEPER : [phone]
Pt. seen and examined.  Agree with above.   -Pt. with no high risk features (no ADHF, no ACS, no severe untreated valvular disease, no unstable arrythmias) prior to low risk procedure  -The patient is optimized from CV perspective for GI procedures  -The patient reports an abnormal NST as an outpatient - would obtain results  -Of note, the patient has been and continues to refuse cardiac catheterization despite knowing the risks which include but not limited to death/MI  -Irregardless, the patient will need GI workup prior to further CV workup to see if she could tolerate ac and antiplt therapy safely.
discussed with patient in detail, all questions answered   D/W all consult services
discussed with patient in detail, all questions answered   follow up with Dr Rahman and Dr Ricks

## 2017-06-16 NOTE — PROGRESS NOTE ADULT - PROBLEM SELECTOR PLAN 4
being repleted IM  DC home on po b12
replete IM x 3 days
BP meds .
replete IM x 3 days

## 2017-06-16 NOTE — PROGRESS NOTE ADULT - SUBJECTIVE AND OBJECTIVE BOX
Patient is a 67y old  Female who presents with a chief complaint of     SUBJECTIVE / OVERNIGHT EVENTS: No nausea, vomiting or diarrhea, no fever or chills, no dizziness or chest pain, no dysuria or hematuria, no jt pain or swelling    MEDICATIONS  (STANDING):  buPROPion SR 200milliGRAM(s) Oral daily  isosorbide   mononitrate ER Tablet (IMDUR) 60milliGRAM(s) Oral daily  ranolazine 500milliGRAM(s) Oral two times a day  diltiazem   CD 180milliGRAM(s) Oral daily  clonazePAM Tablet 0.5milliGRAM(s) Oral two times a day  PARoxetine 30milliGRAM(s) Oral daily  cholecalciferol 1000Unit(s) Oral daily  pantoprazole    Tablet 40milliGRAM(s) Oral two times a day before meals  docusate sodium 100milliGRAM(s) Oral two times a day    MEDICATIONS  (PRN):      Vital Signs Last 24 Hrs  T(C): 36.4, Max: 36.7 (06-15 @ 21:43)  HR: 70 (70 - 85)  BP: 125/77 (112/70 - 125/77)  RR: 18 (18 - 18)  SpO2: 95% (95% - 96%)  Wt(kg): --  CAPILLARY BLOOD GLUCOSE    I&O's Summary  I & Os for 24h ending 16 Jun 2017 07:00  =============================================  IN: 420 ml / OUT: 0 ml / NET: 420 ml    I & Os for current day (as of 16 Jun 2017 13:41)  =============================================  IN: 320 ml / OUT: 0 ml / NET: 320 ml    PHYSICAL EXAM:    GENERAL: NAD, well-developed  HEAD:  Atraumatic, Normocephalic  EYES: EOMI, PERRLA, conjunctiva and sclera clear mild pallor  NECK: Supple, No JVD  CHEST/LUNG: Clear to auscultation bilaterally; No wheeze  HEART: Regular rate and rhythm; No murmurs, rubs, or gallops  ABDOMEN: Soft, Nontender, Nondistended; Bowel sounds present  EXTREMITIES:  2+ Peripheral Pulses, No clubbing, cyanosis, or edema  PSYCH: AAOx3  NEUROLOGY: non-focal  SKIN: No rashes or lesions      LABS:                        11.6   5.5   )-----------( 243      ( 16 Jun 2017 06:51 )             36.6     06-16    143  |  106  |  19  ----------------------------<  102<H>  4.2   |  25  |  1.10    Ca    9.1      16 Jun 2017 06:51                  Consultant(s) Notes Reviewed:      Care Discussed with Consultants/Other Providers:    Contact Number, Dr Rose [phone]

## 2017-06-16 NOTE — PROGRESS NOTE ADULT - PROBLEM SELECTOR PLAN 2
well controlled  monitor
For EGD today.
well controlled, bordering upon low  hold parameters for meds

## 2017-06-16 NOTE — PROGRESS NOTE ADULT - PROBLEM SELECTOR PLAN 5
Stress mild positive  no immediate intervention required
Unclear prior work up and status  defer to cardiology to clarify
eventual angiogram
eventual angiogram if stress positive
Unclear prior work up and status  defer to cardiology to clarify
Will recheck Lipids .

## 2017-06-16 NOTE — PROGRESS NOTE ADULT - PROBLEM SELECTOR PROBLEM 2
Anemia, unspecified type
Hypertension
Anemia, unspecified type
Anemia, unspecified type
Hypertension
R/O Anemia

## 2017-06-16 NOTE — PROGRESS NOTE ADULT - PROBLEM SELECTOR PLAN 1
S/P EGD w/ gastric ulcers, gastritis  - Pathology (-)  - Monitor H/H and cbc - transfuse prn  - Gastrin level pending  - PPI 40 mg  po bid   - Diet as tolerated  - Repeat EGD In 8 weeks S/P EGD w/ gastric ulcers, gastritis  - Pathology (-)  - Monitor H/H and cbc - transfuse prn  - Gastrin level pending  - PPI 40 mg  po bid   - Diet as tolerated  - Repeat EGD In 8 weeks  - Outpatient appointment made -- information given to patient

## 2017-06-16 NOTE — PROGRESS NOTE ADULT - PROBLEM SELECTOR PLAN 1
stress noted  There is a small mild defect in apical inferolateral  wall that is reversible, suggestive of ischemia. The  defect does not resolve with prone imaging.  Defer to cardiology  No need for immediate angiogram  Pt with multiple ulcers not a candidate for dual antiplatelet therapy anyway

## 2017-06-16 NOTE — PROGRESS NOTE ADULT - SUBJECTIVE AND OBJECTIVE BOX
Subjective: pt seen and examined , no chest pain , ROS -.    buPROPion SR 200milliGRAM(s) Oral daily  isosorbide   mononitrate ER Tablet (IMDUR) 60milliGRAM(s) Oral daily  ranolazine 500milliGRAM(s) Oral two times a day  diltiazem   CD 180milliGRAM(s) Oral daily  clonazePAM Tablet 0.5milliGRAM(s) Oral two times a day  PARoxetine 30milliGRAM(s) Oral daily  cholecalciferol 1000Unit(s) Oral daily  pantoprazole    Tablet 40milliGRAM(s) Oral two times a day before meals  docusate sodium 100milliGRAM(s) Oral two times a day                            11.6   5.5   )-----------( 231      ( 15 Jonny 2017 06:42 )             35.2       Hemoglobin: 11.6 g/dL (06-15 @ 06:42)  Hemoglobin: 11.5 g/dL ( @ 06:17)  Hemoglobin: 12.0 g/dL ( @ 21:53)  Hemoglobin: 7.6 g/dL ( @ 06:59)  Hemoglobin: 8.4 g/dL ( @ 07:13)      06-15    145  |  107  |  20  ----------------------------<  131<H>  4.2   |  24  |  1.15    Ca    8.6      15 Jonny 2017 06:42      Creatinine Trend: 1.15<--, 1.14<--, 1.16<--, 1.05<--, 1.08<--    COAGS:           T(C): 36.7, Max: 36.7 (06-15 @ 21:43)  HR: 85 (68 - 85)  BP: 108/63 (90/56 - 108/63)  RR: 18 (18 - 18)  SpO2: 98% (98% - 98%)  Wt(kg): --    I&O's Summary  I & Os for 24h ending 15 Jonny 2017 07:00  =============================================  IN: 120 ml / OUT: 0 ml / NET: 120 ml    I & Os for current day (as of 2017 01:11)  =============================================  IN: 300 ml / OUT: 0 ml / NET: 300 ml      Daily     Daily Weight in k.5 (15 Jonny 2017 04:56)    Appearance: Normal	  HEENT:   Normal oral mucosa, PERRL, EOMI	  Lymphatic: No lymphadenopathy , no edema  Cardiovascular: Normal S1 S2, No JVD, No murmurs , Peripheral pulses palpable 2+ bilaterally  Respiratory: Lungs clear to auscultation, normal effort 	  Gastrointestinal:  Soft, Non-tender, + BS	  Skin: No rashes, No ecchymoses, No cyanosis, warm to touch  Musculoskeletal: Normal range of motion, normal strength  Psychiatry:  Mood & affect appropriate    TELEMETRY: 	  NSR  ECG:  	    RADIOLOGY:   DIAGNOSTIC TESTING:  [x ] Echocardiogram:   Conclusions:  1. Mild mitral regurgitation (severity may be  underestimated).  2. Normal left ventricular internal dimensions and wall  thicknesses.  3. Normal left ventricular systolic function. No segmental  wall motion abnormalities.  4. Mild-moderate tricuspid regurgitation.  5. Mild pulmonic regurgitation. Estimated pulmonary artery  systolic pressure equals 42  mm Hg, assuming right atrial  pressure equals 8 mm Hg, consistent with mild pulmonary  pressures.  ------------------------------------------------------------------------  Confirmed on  2017 - 14:55:54 by Pradip Clarke M.D.  ------------------------------------------------------------------------    [ ]  Catheterization:  [ ] Stress Test:    OTHER:   Upper endoscopy:                              Impression:          - Normal esophagus.                       - Non-bleeding gastric ulcers. Biopsied.                       - Gastritis. Biopsied.                       - Normal examined duodenum.  Recommendation:      - [facility].                       - Resume regular diet.                       - Await pathology                       - Gastrin level                       - Protonix 40mg PO BID                       - Repeat EGD in 8 weeks      ASSESSMENT/PLAN: 	67y Female hx of HTN,CAD, Chol, spinal sx , now syncope, LOC, anemia , s/p PRBC.    -contact primary Cardiologist to obtain outpatient records  -cont trend h/h  cont Cardizem , isosorbide, Ranexa   cont GI / DVT prophylaxis.  cont keep K>4, mag >2.0   due to anemia, would avoid dual antiplt threapy.  GI follow up - gastric ulcers, cont ppi q 12   cardiac marker negative x 3 sets.  The patient has been and continues to refuse cardiac catheterization despite knowing the risks which include but not limited to death/MI   d/c  planning per 1 team  no s/s chf   D/W Dr Rahman

## 2017-06-16 NOTE — PROVIDER CONTACT NOTE (OTHER) - SITUATION
Patient C/O constipation, BM 6/14 but had difficulty moving his bowels
Patient's Lab Complete Blood Count resulted post Packed Red Cells Infusions.

## 2017-06-16 NOTE — PROGRESS NOTE ADULT - PROBLEM SELECTOR PLAN 3
guaiac positive  EGD today  Hb dropped to < 8 today  transfuse two units to optimize prior to EGD
guaiac positive  GI eval in progress   EGD tomorrow
s/p 2 units pRBC  appropriate response  EGD noted multiple gastric ulcers  on PPI BID  Hb stable
s/p 2 units pRBC  appropriate response  EGD noted multiple gastric ulcers  on PPI BID  Hb stable
As expected pts Hb did drop after hydration (BUN/creat ratio was 51: 1.08 on admission) hence my strong suspicion of UGI bleed likely secondary to known h/o gastric ulcer as outpatient. Also need to r/o malignancy)  workup consistent with iron (likely secondary to chronic GI blood loss) and B12 deficiency   Will replete B12, hold off on iron at this time
Asymptomatic and s/p W/u. Awaiting Cardiac cath.

## 2017-08-01 ENCOUNTER — INPATIENT (INPATIENT)
Facility: HOSPITAL | Age: 67
LOS: 0 days | Discharge: HOME | End: 2017-08-02
Attending: INTERNAL MEDICINE | Admitting: INTERNAL MEDICINE
Payer: MEDICARE

## 2017-08-01 VITALS
DIASTOLIC BLOOD PRESSURE: 71 MMHG | RESPIRATION RATE: 18 BRPM | OXYGEN SATURATION: 100 % | TEMPERATURE: 99 F | SYSTOLIC BLOOD PRESSURE: 140 MMHG | HEART RATE: 65 BPM

## 2017-08-01 DIAGNOSIS — Z87.19 PERSONAL HISTORY OF OTHER DISEASES OF THE DIGESTIVE SYSTEM: ICD-10-CM

## 2017-08-01 DIAGNOSIS — I10 ESSENTIAL (PRIMARY) HYPERTENSION: ICD-10-CM

## 2017-08-01 DIAGNOSIS — F41.9 ANXIETY DISORDER, UNSPECIFIED: ICD-10-CM

## 2017-08-01 DIAGNOSIS — R07.9 CHEST PAIN, UNSPECIFIED: ICD-10-CM

## 2017-08-01 DIAGNOSIS — J45.909 UNSPECIFIED ASTHMA, UNCOMPLICATED: ICD-10-CM

## 2017-08-01 DIAGNOSIS — Z98.890 OTHER SPECIFIED POSTPROCEDURAL STATES: Chronic | ICD-10-CM

## 2017-08-01 DIAGNOSIS — K28.4 CHRONIC OR UNSPECIFIED GASTROJEJUNAL ULCER WITH HEMORRHAGE: ICD-10-CM

## 2017-08-01 DIAGNOSIS — I25.10 ATHEROSCLEROTIC HEART DISEASE OF NATIVE CORONARY ARTERY WITHOUT ANGINA PECTORIS: ICD-10-CM

## 2017-08-01 DIAGNOSIS — Z96.659 PRESENCE OF UNSPECIFIED ARTIFICIAL KNEE JOINT: Chronic | ICD-10-CM

## 2017-08-01 LAB
ALBUMIN SERPL ELPH-MCNC: 4.3 G/DL — SIGNIFICANT CHANGE UP (ref 3.3–5)
ALP SERPL-CCNC: 63 U/L — SIGNIFICANT CHANGE UP (ref 40–120)
ALT FLD-CCNC: 21 U/L — SIGNIFICANT CHANGE UP (ref 4–33)
APTT BLD: 29.5 SEC — SIGNIFICANT CHANGE UP (ref 27.5–37.4)
AST SERPL-CCNC: 24 U/L — SIGNIFICANT CHANGE UP (ref 4–32)
BASOPHILS # BLD AUTO: 0.01 K/UL — SIGNIFICANT CHANGE UP (ref 0–0.2)
BASOPHILS NFR BLD AUTO: 0.2 % — SIGNIFICANT CHANGE UP (ref 0–2)
BILIRUB SERPL-MCNC: 0.4 MG/DL — SIGNIFICANT CHANGE UP (ref 0.2–1.2)
BUN SERPL-MCNC: 15 MG/DL — SIGNIFICANT CHANGE UP (ref 7–23)
CALCIUM SERPL-MCNC: 9 MG/DL — SIGNIFICANT CHANGE UP (ref 8.4–10.5)
CHLORIDE SERPL-SCNC: 103 MMOL/L — SIGNIFICANT CHANGE UP (ref 98–107)
CK MB BLD-MCNC: 1.46 NG/ML — SIGNIFICANT CHANGE UP (ref 1–4.7)
CK MB BLD-MCNC: 1.71 NG/ML — SIGNIFICANT CHANGE UP (ref 1–4.7)
CK MB BLD-MCNC: SIGNIFICANT CHANGE UP (ref 0–2.5)
CK SERPL-CCNC: 100 U/L — SIGNIFICANT CHANGE UP (ref 25–170)
CK SERPL-CCNC: 97 U/L — SIGNIFICANT CHANGE UP (ref 25–170)
CO2 SERPL-SCNC: 27 MMOL/L — SIGNIFICANT CHANGE UP (ref 22–31)
CREAT SERPL-MCNC: 1.2 MG/DL — SIGNIFICANT CHANGE UP (ref 0.5–1.3)
EOSINOPHIL # BLD AUTO: 0.2 K/UL — SIGNIFICANT CHANGE UP (ref 0–0.5)
EOSINOPHIL NFR BLD AUTO: 4.8 % — SIGNIFICANT CHANGE UP (ref 0–6)
GLUCOSE SERPL-MCNC: 102 MG/DL — HIGH (ref 70–99)
HCT VFR BLD CALC: 37.5 % — SIGNIFICANT CHANGE UP (ref 34.5–45)
HGB BLD-MCNC: 12.5 G/DL — SIGNIFICANT CHANGE UP (ref 11.5–15.5)
IMM GRANULOCYTES # BLD AUTO: 0.01 # — SIGNIFICANT CHANGE UP
IMM GRANULOCYTES NFR BLD AUTO: 0.2 % — SIGNIFICANT CHANGE UP (ref 0–1.5)
INR BLD: 1 — SIGNIFICANT CHANGE UP (ref 0.88–1.17)
LYMPHOCYTES # BLD AUTO: 1.78 K/UL — SIGNIFICANT CHANGE UP (ref 1–3.3)
LYMPHOCYTES # BLD AUTO: 42.3 % — SIGNIFICANT CHANGE UP (ref 13–44)
MCHC RBC-ENTMCNC: 31.1 PG — SIGNIFICANT CHANGE UP (ref 27–34)
MCHC RBC-ENTMCNC: 33.3 % — SIGNIFICANT CHANGE UP (ref 32–36)
MCV RBC AUTO: 93.3 FL — SIGNIFICANT CHANGE UP (ref 80–100)
MONOCYTES # BLD AUTO: 0.44 K/UL — SIGNIFICANT CHANGE UP (ref 0–0.9)
MONOCYTES NFR BLD AUTO: 10.5 % — SIGNIFICANT CHANGE UP (ref 2–14)
NEUTROPHILS # BLD AUTO: 1.77 K/UL — LOW (ref 1.8–7.4)
NEUTROPHILS NFR BLD AUTO: 42 % — LOW (ref 43–77)
NRBC # FLD: 0 — SIGNIFICANT CHANGE UP
PLATELET # BLD AUTO: 248 K/UL — SIGNIFICANT CHANGE UP (ref 150–400)
PMV BLD: 9.8 FL — SIGNIFICANT CHANGE UP (ref 7–13)
POTASSIUM SERPL-MCNC: 4.3 MMOL/L — SIGNIFICANT CHANGE UP (ref 3.5–5.3)
POTASSIUM SERPL-SCNC: 4.3 MMOL/L — SIGNIFICANT CHANGE UP (ref 3.5–5.3)
PROT SERPL-MCNC: 7.8 G/DL — SIGNIFICANT CHANGE UP (ref 6–8.3)
PROTHROM AB SERPL-ACNC: 11.2 SEC — SIGNIFICANT CHANGE UP (ref 9.8–13.1)
RBC # BLD: 4.02 M/UL — SIGNIFICANT CHANGE UP (ref 3.8–5.2)
RBC # FLD: 14.1 % — SIGNIFICANT CHANGE UP (ref 10.3–14.5)
SODIUM SERPL-SCNC: 144 MMOL/L — SIGNIFICANT CHANGE UP (ref 135–145)
TROPONIN T SERPL-MCNC: < 0.06 NG/ML — SIGNIFICANT CHANGE UP (ref 0–0.06)
TROPONIN T SERPL-MCNC: < 0.06 NG/ML — SIGNIFICANT CHANGE UP (ref 0–0.06)
WBC # BLD: 4.21 K/UL — SIGNIFICANT CHANGE UP (ref 3.8–10.5)
WBC # FLD AUTO: 4.21 K/UL — SIGNIFICANT CHANGE UP (ref 3.8–10.5)

## 2017-08-01 PROCEDURE — 71020: CPT | Mod: 26

## 2017-08-01 RX ORDER — DILTIAZEM HCL 120 MG
1 CAPSULE, EXT RELEASE 24 HR ORAL
Qty: 0 | Refills: 0 | COMMUNITY

## 2017-08-01 RX ORDER — RANOLAZINE 500 MG/1
500 TABLET, FILM COATED, EXTENDED RELEASE ORAL
Qty: 0 | Refills: 0 | Status: DISCONTINUED | OUTPATIENT
Start: 2017-08-01 | End: 2017-08-02

## 2017-08-01 RX ORDER — CHOLECALCIFEROL (VITAMIN D3) 125 MCG
1 CAPSULE ORAL
Qty: 0 | Refills: 0 | COMMUNITY

## 2017-08-01 RX ORDER — BUPROPION HYDROCHLORIDE 150 MG/1
150 TABLET, EXTENDED RELEASE ORAL DAILY
Qty: 0 | Refills: 0 | Status: DISCONTINUED | OUTPATIENT
Start: 2017-08-01 | End: 2017-08-02

## 2017-08-01 RX ORDER — ASPIRIN/CALCIUM CARB/MAGNESIUM 324 MG
81 TABLET ORAL DAILY
Qty: 0 | Refills: 0 | Status: DISCONTINUED | OUTPATIENT
Start: 2017-08-01 | End: 2017-08-02

## 2017-08-01 RX ORDER — OMEPRAZOLE 10 MG/1
1 CAPSULE, DELAYED RELEASE ORAL
Qty: 0 | Refills: 0 | COMMUNITY

## 2017-08-01 RX ORDER — ASPIRIN/CALCIUM CARB/MAGNESIUM 324 MG
1 TABLET ORAL
Qty: 0 | Refills: 0 | COMMUNITY

## 2017-08-01 RX ORDER — CHOLECALCIFEROL (VITAMIN D3) 125 MCG
1000 CAPSULE ORAL DAILY
Qty: 0 | Refills: 0 | Status: DISCONTINUED | OUTPATIENT
Start: 2017-08-01 | End: 2017-08-02

## 2017-08-01 RX ORDER — BUPROPION HYDROCHLORIDE 150 MG/1
200 TABLET, EXTENDED RELEASE ORAL DAILY
Qty: 0 | Refills: 0 | Status: DISCONTINUED | OUTPATIENT
Start: 2017-08-01 | End: 2017-08-01

## 2017-08-01 RX ORDER — DILTIAZEM HCL 120 MG
180 CAPSULE, EXT RELEASE 24 HR ORAL DAILY
Qty: 0 | Refills: 0 | Status: DISCONTINUED | OUTPATIENT
Start: 2017-08-01 | End: 2017-08-02

## 2017-08-01 RX ORDER — PANTOPRAZOLE SODIUM 20 MG/1
40 TABLET, DELAYED RELEASE ORAL
Qty: 0 | Refills: 0 | Status: DISCONTINUED | OUTPATIENT
Start: 2017-08-01 | End: 2017-08-02

## 2017-08-01 RX ORDER — ISOSORBIDE MONONITRATE 60 MG/1
60 TABLET, EXTENDED RELEASE ORAL DAILY
Qty: 0 | Refills: 0 | Status: DISCONTINUED | OUTPATIENT
Start: 2017-08-01 | End: 2017-08-02

## 2017-08-01 RX ORDER — RANITIDINE HYDROCHLORIDE 150 MG/1
1 TABLET, FILM COATED ORAL
Qty: 0 | Refills: 0 | COMMUNITY

## 2017-08-01 RX ORDER — CALCIUM CARBONATE 500(1250)
1 TABLET ORAL
Qty: 0 | Refills: 0 | Status: DISCONTINUED | OUTPATIENT
Start: 2017-08-01 | End: 2017-08-01

## 2017-08-01 RX ORDER — CLONAZEPAM 1 MG
0.5 TABLET ORAL
Qty: 0 | Refills: 0 | Status: DISCONTINUED | OUTPATIENT
Start: 2017-08-01 | End: 2017-08-02

## 2017-08-01 RX ORDER — METOPROLOL TARTRATE 50 MG
1 TABLET ORAL
Qty: 0 | Refills: 0 | COMMUNITY

## 2017-08-01 RX ORDER — BUPROPION HYDROCHLORIDE 150 MG/1
1 TABLET, EXTENDED RELEASE ORAL
Qty: 0 | Refills: 0 | COMMUNITY

## 2017-08-01 RX ORDER — CALCIUM CARBONATE 500(1250)
1 TABLET ORAL
Qty: 0 | Refills: 0 | COMMUNITY

## 2017-08-01 RX ORDER — CALCIUM CARBONATE 500(1250)
1 TABLET ORAL
Qty: 0 | Refills: 0 | Status: DISCONTINUED | OUTPATIENT
Start: 2017-08-01 | End: 2017-08-02

## 2017-08-01 RX ORDER — CLONAZEPAM 1 MG
1 TABLET ORAL
Qty: 0 | Refills: 0 | COMMUNITY

## 2017-08-01 RX ORDER — ISOSORBIDE MONONITRATE 60 MG/1
60 TABLET, EXTENDED RELEASE ORAL DAILY
Qty: 0 | Refills: 0 | Status: DISCONTINUED | OUTPATIENT
Start: 2017-08-01 | End: 2017-08-01

## 2017-08-01 RX ORDER — FAMOTIDINE 10 MG/ML
20 INJECTION INTRAVENOUS DAILY
Qty: 0 | Refills: 0 | Status: DISCONTINUED | OUTPATIENT
Start: 2017-08-01 | End: 2017-08-02

## 2017-08-01 RX ADMIN — RANOLAZINE 500 MILLIGRAM(S): 500 TABLET, FILM COATED, EXTENDED RELEASE ORAL at 18:00

## 2017-08-01 RX ADMIN — Medication 0.5 MILLIGRAM(S): at 18:00

## 2017-08-01 RX ADMIN — Medication 1 TABLET(S): at 18:25

## 2017-08-01 NOTE — CONSULT NOTE ADULT - ATTENDING COMMENTS
EGD performed with Dr. Nice on 7/29  it showed resolution of her gastric ulcer  no gi objection to proceed with cardiac cath if needed

## 2017-08-01 NOTE — CONSULT NOTE ADULT - ASSESSMENT
68yo female presents with chest pain and sob noted to have positive stress test on previous admission in June as well as anemia with cratered nonbleeding gastric ulcers and gastritis on EGD with negative biopsies. The patient is now being evaluated by cardiology with plans for cardiac catheterization +/- dapt if not at risk for gib

## 2017-08-01 NOTE — CONSULT NOTE ADULT - PROBLEM SELECTOR RECOMMENDATION 2
-cbc qd  -hgb stable  -ppi bid/carafate qid  -EGD in June with cratered nonbleeding gastric ulcers; with repeat EGD done with Dr. Nice last week  -would hold off on cardiac catheterization with dapt until EGD report obtained from Dr. Lopez office as the gastric ulcers were severe on egd in June  -further recommendations pending recent EGD report

## 2017-08-01 NOTE — H&P ADULT - PROBLEM SELECTOR PLAN 2
Admit to telemetry, serial cardiac enzymes, serial EKGs  Check CBC, CMP, TSH, FLP, HgA1C  Continue Ranexa  F/U MD note

## 2017-08-01 NOTE — H&P ADULT - MUSCULOSKELETAL COMMENTS
decreased strength right arm and right leg, left ankle pain from "sprain" walks with cane decreased strength right arm and leg, decreased right hand grasp, gait normal

## 2017-08-01 NOTE — CONSULT NOTE ADULT - SUBJECTIVE AND OBJECTIVE BOX
Chief Complaint:  Patient is a 67y old  Female who presents with a chief complaint of chest pain, SOB and positive stress test     Asthma  Gastrojejunal ulcer with hemorrhage  Anxiety and depression  CAD (coronary artery disease)  Hypercholesterolemia  Hypertension  H/O laminectomy  S/P TKR (total knee replacement)  No significant past surgical history     HPI:  66 yo female who comes to the ER today with complaints of chest pain and shortness of breath with walking 1 1/2 blocks associated with left arm pain (may be chronic arm pain) which is decreased with rest, however, occasionally she has these symptoms at rest as well. In June the patient was evaluated at [facility] for fall with a negative CTH and found to have a positive stress test, however, refused intervention at that time. Additionally, while at [facility] she was found to be anemic with H/H 7.6/21.9 and underwent EGD and found to have nonbleeding gastric ulcers and gastritis. The patient reports since that time she has felt well with no abdominal pains and no melena/hematochezia and last week she had a repeat EGD with Dr. Nice for which showed improvement in ulcers as per the patient (egd report from dr. juarez office pending)      latex (Rash)  No Known Drug Allergies  shellfish (Hives)      aspirin enteric coated 81 milliGRAM(s) Oral daily  ranolazine 500 milliGRAM(s) Oral two times a day  clonazePAM Tablet 0.5 milliGRAM(s) Oral two times a day  PARoxetine 30 milliGRAM(s) Oral daily  pantoprazole    Tablet 40 milliGRAM(s) Oral two times a day before meals  cholecalciferol 1000 Unit(s) Oral daily  calcium carbonate 1250 mG (OsCal) 1 Tablet(s) Oral two times a day  famotidine    Tablet 20 milliGRAM(s) Oral daily  isosorbide   mononitrate ER Tablet (IMDUR) 60 milliGRAM(s) Oral daily  diltiazem    milliGRAM(s) Oral daily  buPROPion XL . 150 milliGRAM(s) Oral daily        FAMILY HISTORY:  Family history of diabetes mellitus (DM) (Aunt)  Family history of early CAD        Review of Systems:    General:  No wt loss, fevers, chills, night sweats,fatigue,   Eyes:  Good vision, no reported pain  ENT:  No sore throat, pain, runny nose, dysphagia  CV:  No pain, palpitatioins, hypo/hypertension  Resp:  No dyspnea, cough, tachypnea, wheezing  GI: denies n/v/d/c, abdominal pain, melena or brbpr   :  No pain, bleeding, incontinence, nocturia  Muscle:  No pain, weakness  Neuro:  No weakness, tingling, memory problems  Psych:  No fatigue, insomnia, mood problems, depression  Endocrine:  No polyuria, polydypsia, cold/heat intolerance  Heme:  No petechiae, ecchymosis, easy bruisability  Skin:  No rash, tattoos, scars, edema    Relevant Family History:       Relevant Social History:       Physical Exam:    Vital Signs:  Vital Signs Last 24 Hrs  T(C): 37.1 (01 Aug 2017 10:23), Max: 37.1 (01 Aug 2017 10:23)  T(F): 98.7 (01 Aug 2017 10:23), Max: 98.7 (01 Aug 2017 10:23)  HR: 66 (01 Aug 2017 11:33) (65 - 66)  BP: 136/82 (01 Aug 2017 11:33) (136/82 - 140/71)  BP(mean): --  RR: 18 (01 Aug 2017 11:33) (18 - 18)  SpO2: 99% (01 Aug 2017 11:33) (99% - 100%)  Daily     Daily     General:  Appears stated age, well-groomed, well-nourished, no distress  HEENT:  NC/AT,  conjunctivae clear and pink, no thyromegaly, nodules, adenopathy, no JVD  Chest:  Full & symmetric excursion, no increased effort, breath sounds clear  Cardiovascular:  Regular rhythm, S1, S2, no murmur/rub/S3/S4, no abdominal bruit, no edema  Abdomen:  Soft, non-tender, non-distended, normoactive bowel sounds,  no masses ,no hepatosplenomeagaly, no signs of chronic liver disease  Extremities:  no cyanosis,clubbing or edema  Skin:  No rash/erythema/ecchymoses/petechiae/wounds/abscess/warm/dry  Neuro/Psych:  A&Ox3  , no asterixis, no tremor, no encephalopathy    Laboratory:                            12.5   4.21  )-----------( 248      ( 01 Aug 2017 10:51 )             37.5     08-01    144  |  103  |  15  ----------------------------<  102<H>  4.3   |  27  |  1.20    Ca    9.0      01 Aug 2017 10:51    TPro  7.8  /  Alb  4.3  /  TBili  0.4  /  DBili  x   /  AST  24  /  ALT  21  /  AlkPhos  63  08-01    LIVER FUNCTIONS - ( 01 Aug 2017 10:51 )  Alb: 4.3 g/dL / Pro: 7.8 g/dL / ALK PHOS: 63 u/L / ALT: 21 u/L / AST: 24 u/L / GGT: x           PT/INR - ( 01 Aug 2017 10:51 )   PT: 11.2 SEC;   INR: 1.00          PTT - ( 01 Aug 2017 10:51 )  PTT:29.5 SEC      Imaging:  < from: Upper Endoscopy (06.14.17 @ 18:32) >    [facility]  ____________________________________________________________________________________________________  Patient Name: Mele Humphreys                     MRN: [id number]  Account Number: [id number]                     YOB: 1950  Room: Endoscopy Room 1                           Gender: Female  Attending MD: Patrice Ricks MD                 Procedure Date No Time: 6/14/2017  ____________________________________________________________________________________________________     Procedure:           Upper GI endoscopy  Indications:         Follow-up of acute peptic ulcer  Providers:           Patrice Ricks MD  Medicines:           Monitored Anesthesia Care  Complications:       No immediate complications.  ____________________________________________________________________________________________________  Procedure:           After obtaining informed consent, the endoscope was passed under direct                        vision. Throughout theprocedure, the patient's blood pressure, pulse, and                        oxygen saturations were monitored continuously. The Endoscope was introduced                        through the mouth, and advanced to the second part of duodenum. The upper GI                        endoscopy was accomplished without difficulty. The patient tolerated the                        procedure well.                                                                                                        Findings:      The examined esophagus was normal.       Few non-bleeding cratered gastric ulcers were found in the gastric antrum. The largest lesion        was 10 mm in largest dimension. Biopsies were taken with a cold forceps for histology.       Moderate inflammation was found in the gastric antrum. Biopsies were taken with a cold        forceps for histology. Estimated blood loss: none.       The examined duodenum was normal.                           Impression:          - Normal esophagus.                       - Non-bleeding gastric ulcers. Biopsied.                       - Gastritis. Biopsied.                       - Normal examined duodenum.  Recommendation:      - [facility].                       - Resume regular diet.                       - Await pathology                       - Gastrin level                       - Protonix 40mg PO BID                       - Repeat EGD in 8 weeks                                                                                                        Attending Participation:       I personally performed the entire procedure.                                               _________________  Patrice Ricks MD  6/14/2017 6:59:12 PM  Number of Addenda: 0    Note Initiated On: 6/14/2017 6:32 PM    < end of copied text >    ----------------------------------------  Surgical Pathology Report (06.14.17 @ 18:50)    Surgical Pathology Report:   ACCESSION No:  [id number]    MELE HUMPHREYS                       1        Surgical Final Report          Final Diagnosis  1. Gastric ulcer, endoscopic biopsy:  - Chronic nonspecific gastritis and reactive gastropathy  - Negative for H. pylori (Warthin-Starry stain)    2. Stomach, endoscopic biopsy:  - Gastric mucosa with inactive chronic nonspecific  gastritis  - Negative for H. pylori (Warthin-Starry stain)    HOLGER POLK M.D.  (Electronic Signature)  Reported on: 06/15/17    Clinical History  anemia, gastric ulcer  R/O H. Pylori, dysphasia    Specimen(s) Submitted  1     Gastric ulcer bx  2     Stomach biopsy    Gross Description  1. The specimen is received in formalin and the specimen  container is labeled: Gastric ulcer biopsy.  It consists of a 0.6  cm in greatest dimension fragment of soft, tan-pink tissue.  Entirely submitted.  One cassette.    2. The specimen is received in formalin and the specimen  container is labeled: Stomach biopsy.  It  consists of two  fragments of soft, tan-pink tissue ranging from 0.4 to 0.5 cm in  maximum dimension.  Entirely submitted.  One cassette.    In addition to other data that may appear on the specimen  containers, the label has been inspected to confirm the presence  of the patient's name and date of birth.   06/14/17 20:27

## 2017-08-01 NOTE — H&P ADULT - NEGATIVE CARDIOVASCULAR SYMPTOMS
no peripheral edema/no palpitations/no chest pain/no dyspnea on exertion/no claudication/no orthopnea

## 2017-08-01 NOTE — H&P ADULT - PMH
Anxiety and depression    Asthma  last attack 1 year ago  CAD (coronary artery disease)    Gastrojejunal ulcer with hemorrhage    Hypertension

## 2017-08-01 NOTE — H&P ADULT - ATTENDING COMMENTS
Pt. seen and examined.  Agree with above.   -Admit to tele  -DEONNA with 3 sets CE  -GI eval for history of GIB  -Cath if patient able to tolerate DAPT

## 2017-08-01 NOTE — H&P ADULT - HISTORY OF PRESENT ILLNESS
66 yo female who comes to the ER today with complaints of chest pain and shortness of breath with walking 1 1/2 blocks associated with left arm pain (may be chronic arm pain) which is decreased with rest.  Occasionally she has these symptoms at rest as well.  In June 2017 she fell 4 times.  She hit her head the last time, CT scan performed - No intracranial hemorrhage or mass.  Was evaluated at [facility] and found to be anemic with H/H 7.6/21.9 and she was transfused 2 units PRBC's.  A stress test was done that showed abnormalities and a cardiac catheterization was recommended.  She refused any further intervention at that time.    Pt also had an MRI of the brain one week ago, results not known at this time.    At this time she denies any , SOB, dizziness, Melena, epigastric pain.

## 2017-08-01 NOTE — ED PROVIDER NOTE - ATTENDING CONTRIBUTION TO CARE
67 year old with htn presents with intermittent cp and sob on exertion.  concern for acs. less likely pna will get cxr.  doubt dissection given description of symptoms.  low risk wells.  well appearing.    CON : NAD  EENT : EOMI, MMM  NECK : Full ROM  RESP : CTAB no increased WOB  CARD : rrr no m/r/g  ABD : S NT ND NABS no rebound  EXT : No edema  NEURO : AAOX3

## 2017-08-01 NOTE — ED PROVIDER NOTE - OBJECTIVE STATEMENT
66 y/o female pmh htn, hld c/o chest pain and sob x2-3 weeks. Pt was sent in my Cardiologist Dr. Frazier for admission. Pt admits to substernal chest pain, exertional, non radiating, associated with PALM. Pt admits to having symptoms after 1.5 blocks or 1 flight of stairs. Was admitted to [facility] x1 month ago for syncope where she had an abnormal stress but managed medically. Pt denies palpitations, diaphoresis, n/v/d, numbness, tingling ,weakness, dizziness, syncope, fever or chills.

## 2017-08-01 NOTE — H&P ADULT - RS GEN PE MLT RESP DETAILS PC
clear to auscultation bilaterally/breath sounds equal/no rales/respirations non-labored/normal/no chest wall tenderness/good air movement/airway patent

## 2017-08-02 ENCOUNTER — TRANSCRIPTION ENCOUNTER (OUTPATIENT)
Age: 67
End: 2017-08-02

## 2017-08-02 VITALS
DIASTOLIC BLOOD PRESSURE: 60 MMHG | SYSTOLIC BLOOD PRESSURE: 116 MMHG | HEART RATE: 68 BPM | RESPIRATION RATE: 17 BRPM | OXYGEN SATURATION: 99 % | TEMPERATURE: 98 F

## 2017-08-02 LAB
ALBUMIN SERPL ELPH-MCNC: 4 G/DL — SIGNIFICANT CHANGE UP (ref 3.3–5)
ALP SERPL-CCNC: 59 U/L — SIGNIFICANT CHANGE UP (ref 40–120)
ALT FLD-CCNC: 15 U/L — SIGNIFICANT CHANGE UP (ref 4–33)
APTT BLD: 28.2 SEC — SIGNIFICANT CHANGE UP (ref 27.5–37.4)
AST SERPL-CCNC: 18 U/L — SIGNIFICANT CHANGE UP (ref 4–32)
BILIRUB SERPL-MCNC: 0.2 MG/DL — SIGNIFICANT CHANGE UP (ref 0.2–1.2)
BUN SERPL-MCNC: 20 MG/DL — SIGNIFICANT CHANGE UP (ref 7–23)
CALCIUM SERPL-MCNC: 8.8 MG/DL — SIGNIFICANT CHANGE UP (ref 8.4–10.5)
CHLORIDE SERPL-SCNC: 103 MMOL/L — SIGNIFICANT CHANGE UP (ref 98–107)
CHOLEST SERPL-MCNC: 190 MG/DL — SIGNIFICANT CHANGE UP (ref 120–199)
CO2 SERPL-SCNC: 28 MMOL/L — SIGNIFICANT CHANGE UP (ref 22–31)
CREAT SERPL-MCNC: 1.22 MG/DL — SIGNIFICANT CHANGE UP (ref 0.5–1.3)
GLUCOSE SERPL-MCNC: 99 MG/DL — SIGNIFICANT CHANGE UP (ref 70–99)
HBA1C BLD-MCNC: 5.5 % — SIGNIFICANT CHANGE UP (ref 4–5.6)
HCT VFR BLD CALC: 35.7 % — SIGNIFICANT CHANGE UP (ref 34.5–45)
HDLC SERPL-MCNC: 76 MG/DL — HIGH (ref 45–65)
HGB BLD-MCNC: 11.8 G/DL — SIGNIFICANT CHANGE UP (ref 11.5–15.5)
INR BLD: 0.94 — SIGNIFICANT CHANGE UP (ref 0.88–1.17)
LIPID PNL WITH DIRECT LDL SERPL: 103 MG/DL — SIGNIFICANT CHANGE UP
MAGNESIUM SERPL-MCNC: 2 MG/DL — SIGNIFICANT CHANGE UP (ref 1.6–2.6)
MCHC RBC-ENTMCNC: 31.1 PG — SIGNIFICANT CHANGE UP (ref 27–34)
MCHC RBC-ENTMCNC: 33.1 % — SIGNIFICANT CHANGE UP (ref 32–36)
MCV RBC AUTO: 94.2 FL — SIGNIFICANT CHANGE UP (ref 80–100)
NRBC # FLD: 0 — SIGNIFICANT CHANGE UP
PHOSPHATE SERPL-MCNC: 4.1 MG/DL — SIGNIFICANT CHANGE UP (ref 2.5–4.5)
PLATELET # BLD AUTO: 211 K/UL — SIGNIFICANT CHANGE UP (ref 150–400)
PMV BLD: 9.7 FL — SIGNIFICANT CHANGE UP (ref 7–13)
POTASSIUM SERPL-MCNC: 4.2 MMOL/L — SIGNIFICANT CHANGE UP (ref 3.5–5.3)
POTASSIUM SERPL-SCNC: 4.2 MMOL/L — SIGNIFICANT CHANGE UP (ref 3.5–5.3)
PROT SERPL-MCNC: 6.7 G/DL — SIGNIFICANT CHANGE UP (ref 6–8.3)
PROTHROM AB SERPL-ACNC: 10.5 SEC — SIGNIFICANT CHANGE UP (ref 9.8–13.1)
RBC # BLD: 3.79 M/UL — LOW (ref 3.8–5.2)
RBC # FLD: 14.2 % — SIGNIFICANT CHANGE UP (ref 10.3–14.5)
SODIUM SERPL-SCNC: 145 MMOL/L — SIGNIFICANT CHANGE UP (ref 135–145)
TRIGL SERPL-MCNC: 130 MG/DL — SIGNIFICANT CHANGE UP (ref 10–149)
TSH SERPL-MCNC: 1.15 UIU/ML — SIGNIFICANT CHANGE UP (ref 0.27–4.2)
WBC # BLD: 5.43 K/UL — SIGNIFICANT CHANGE UP (ref 3.8–10.5)
WBC # FLD AUTO: 5.43 K/UL — SIGNIFICANT CHANGE UP (ref 3.8–10.5)

## 2017-08-02 RX ORDER — ISOSORBIDE MONONITRATE 60 MG/1
1 TABLET, EXTENDED RELEASE ORAL
Qty: 0 | Refills: 0 | COMMUNITY

## 2017-08-02 RX ORDER — RANOLAZINE 500 MG/1
1 TABLET, FILM COATED, EXTENDED RELEASE ORAL
Qty: 0 | Refills: 0 | COMMUNITY

## 2017-08-02 RX ORDER — SODIUM CHLORIDE 9 MG/ML
500 INJECTION INTRAMUSCULAR; INTRAVENOUS; SUBCUTANEOUS
Qty: 0 | Refills: 0 | Status: DISCONTINUED | OUTPATIENT
Start: 2017-08-02 | End: 2017-08-02

## 2017-08-02 RX ADMIN — SODIUM CHLORIDE 75 MILLILITER(S): 9 INJECTION INTRAMUSCULAR; INTRAVENOUS; SUBCUTANEOUS at 11:11

## 2017-08-02 RX ADMIN — Medication 30 MILLIGRAM(S): at 13:11

## 2017-08-02 RX ADMIN — Medication 81 MILLIGRAM(S): at 09:23

## 2017-08-02 RX ADMIN — Medication 1000 UNIT(S): at 13:11

## 2017-08-02 RX ADMIN — Medication 0.5 MILLIGRAM(S): at 05:44

## 2017-08-02 RX ADMIN — Medication 180 MILLIGRAM(S): at 05:44

## 2017-08-02 RX ADMIN — BUPROPION HYDROCHLORIDE 150 MILLIGRAM(S): 150 TABLET, EXTENDED RELEASE ORAL at 13:11

## 2017-08-02 RX ADMIN — ISOSORBIDE MONONITRATE 60 MILLIGRAM(S): 60 TABLET, EXTENDED RELEASE ORAL at 09:24

## 2017-08-02 RX ADMIN — RANOLAZINE 500 MILLIGRAM(S): 500 TABLET, FILM COATED, EXTENDED RELEASE ORAL at 05:44

## 2017-08-02 RX ADMIN — PANTOPRAZOLE SODIUM 40 MILLIGRAM(S): 20 TABLET, DELAYED RELEASE ORAL at 06:57

## 2017-08-02 RX ADMIN — Medication 1 TABLET(S): at 05:44

## 2017-08-02 RX ADMIN — FAMOTIDINE 20 MILLIGRAM(S): 10 INJECTION INTRAVENOUS at 13:11

## 2017-08-02 NOTE — DISCHARGE NOTE ADULT - MEDICATION SUMMARY - MEDICATIONS TO STOP TAKING
I will STOP taking the medications listed below when I get home from the hospital:    Ranexa 500 mg oral tablet, extended release  -- 1 tab(s) by mouth 2 times a day    isosorbide mononitrate 60 mg oral tablet, extended release  -- 1 tab(s) by mouth once a day (in the morning)

## 2017-08-02 NOTE — DISCHARGE NOTE ADULT - HOSPITAL COURSE
68 y/o female with a PMHx of HTN, anxiety, depression and PUD presented to ED with chest pain. Pt was admitted to [facility] last month for syncope and was found to have a GI bleed requiring 2 units pRBC. Endoscopy revealed gastritis with no active bleed. During that admission, she had a positive nuclear stress test but had refused cath at that time. This admission, her EKG showed NSR with non-specific T wave inversions. Pt ruled out for ACS with two negative cardiac enzymes. CXR without acute pathology.     Pt was seen by cardiology for her chest pain and they recommended cardiac cath pending clearance by GI. Dr. Ricks from GI was able to obtain recent endoscopy from her GI doctor, which showed improvement in her gastric ulcers with no active bleed. Pt was then cleared by Dr. Ricks for cardiac cath. Pt underwent cardiac cath on 8/2 which revealed luminal disease. Pt was monitored on tele with no events. Pt did well post operatively. Case discussed with Dr. Avila and Dr. Frazier on 8/2. Pt now stable for discharge home.

## 2017-08-02 NOTE — DISCHARGE NOTE ADULT - NS AS ACTIVITY OBS
Bathing allowed/As tolerated/Showering allowed/Walking-Indoors allowed/No Heavy lifting/straining/Walking-Outdoors allowed/Do not drive or operate machinery

## 2017-08-02 NOTE — DISCHARGE NOTE ADULT - CARE PROVIDER_API CALL
Louie Frazier), Cardiovascular Disease; Interventional Cardiology  [address]  [place] [address]  Phone: [phone]  Fax: [phone]

## 2017-08-02 NOTE — DISCHARGE NOTE ADULT - MEDICATION SUMMARY - MEDICATIONS TO TAKE
I will START or STAY ON the medications listed below when I get home from the hospital:    aspirin 81 mg oral tablet  -- 1 tab(s) by mouth once a day  -- Indication: For CAD (coronary artery disease); non-obstructive disease    calcium carbonate  -- 1 tab(s) by mouth once a day  -- Indication: For Supplementation    DilTIAZem Hydrochloride  mg/24 hours oral capsule, extended release  -- 1 cap(s) by mouth once a day (in the morning)  -- Indication: For HTN (hypertension)    clonazePAM 0.5 mg oral tablet  -- 1 tab(s) by mouth 2 times a day  -- Indication: For Anxiety and depression    PARoxetine 30 mg oral tablet  -- 1 tab(s) by mouth once a day (in the evening)  -- Indication: For Anxiety and depression    Metoprolol Succinate ER 25 mg oral tablet, extended release  -- 1 tab(s) by mouth once a day  -- Indication: For HTN (hypertension)    raNITIdine 150 mg oral capsule  -- 1 cap(s) by mouth once a day (at bedtime)  -- Indication: For GERD    omeprazole 40 mg oral delayed release capsule  -- 1 cap(s) by mouth once a day  -- Indication: For GERD    Wellbutrin  mg/12 hours oral tablet, extended release  -- 1 tab(s) by mouth once a day (in the morning)  -- Indication: For Anxiety and depression    multivitamin  -- 1 tab(s) by mouth once a day  -- Indication: For Supplementation    Vitamin D3 1000 intl units oral tablet  -- 1 tab(s) by mouth once a day (in the evening)  -- Indication: For Supplementation

## 2017-08-02 NOTE — PROGRESS NOTE ADULT - ATTENDING COMMENTS
Pt. seen and examined.  Agree with above.   -Cath with no significant cad to explain symptoms  -dc planning

## 2017-08-02 NOTE — PROGRESS NOTE ADULT - PROBLEM SELECTOR PLAN 2
-cbc qd  -hgb stable  -ppi bid/carafate qid  -repeat egd w/dr. gentile showed resolution of ulcer  -dc planning as per primary team

## 2017-08-02 NOTE — CHART NOTE - NSCHARTNOTEFT_GEN_A_CORE
Pt is S/P cardiac cath via right femoral access. Pt arrives to the floor with no complaints. Right femoral dressing is clean, dry, intact with no evidence of bleeding, edema, or hematoma. Right lower extremity warm with good peripheral pulses and good sensation. Will monitor.

## 2017-08-02 NOTE — DISCHARGE NOTE ADULT - PLAN OF CARE
Prevent future episodes. Your chest pain is not cardiac. You had a cardiac catheterization which showed no blockages. Follow up with cardiologist within one week of discharge. Call for appointment. Return to ED for any concerning symptoms. Continue medications as prescribed. Low salt, low fat, low cholesterol diet. Maintain adequate control of your blood pressure. Goal BP < 130/80. Continue low sodium diet. Follow up with PCP and/or cardiologist for ongoing medical management of your hypertension. Continue medications as prescribed. Low salt diet. Continue taking your Zantac and Omeprazole. Follow up with your GI doctor for ongoing management of your peptic ulcer disease.

## 2017-08-02 NOTE — PROGRESS NOTE ADULT - SUBJECTIVE AND OBJECTIVE BOX
INTERVAL HPI/OVERNIGHT EVENTS:    pt denying any nausea or vomiting; no abdominal pain  denied bm this morning, no melena no hematochezia    MEDICATIONS  (STANDING):  aspirin enteric coated 81 milliGRAM(s) Oral daily  ranolazine 500 milliGRAM(s) Oral two times a day  clonazePAM Tablet 0.5 milliGRAM(s) Oral two times a day  PARoxetine 30 milliGRAM(s) Oral daily  pantoprazole    Tablet 40 milliGRAM(s) Oral two times a day before meals  cholecalciferol 1000 Unit(s) Oral daily  calcium carbonate 1250 mG (OsCal) 1 Tablet(s) Oral two times a day  famotidine    Tablet 20 milliGRAM(s) Oral daily  isosorbide   mononitrate ER Tablet (IMDUR) 60 milliGRAM(s) Oral daily  diltiazem    milliGRAM(s) Oral daily  buPROPion XL . 150 milliGRAM(s) Oral daily    MEDICATIONS  (PRN):      Allergies    latex (Rash)  No Known Drug Allergies  shellfish (Hives)    Intolerances        Review of Systems:    General:  No wt loss, fevers, chills, night sweats,fatigue,   Eyes:  Good vision, no reported pain  ENT:  No sore throat, pain, runny nose, dysphagia  CV:  No pain, palpitatioins, hypo/hypertension  Resp:  No dyspnea, cough, tachypnea, wheezing  GI:  No pain, No nausea, No vomiting, No diarrhea, No constipation, No weight loss, No fever, No pruritis, No rectal bleeding, No melenas, No dysphagia  :  No pain, bleeding, incontinence, nocturia  Muscle:  No pain, weakness  Neuro:  No weakness, tingling, memory problems  Psych:  No fatigue, insomnia, mood problems, depression  Endocrine:  No polyuria, polydypsia, cold/heat intolerance  Heme:  No petechiae, ecchymosis, easy bruisability  Skin:  No rash, tattoos, scars, edema      Vital Signs Last 24 Hrs  T(C): 36.7 (02 Aug 2017 05:41), Max: 36.7 (01 Aug 2017 22:18)  T(F): 98.1 (02 Aug 2017 05:41), Max: 98.1 (01 Aug 2017 22:18)  HR: 65 (02 Aug 2017 09:22) (65 - 71)  BP: 110/62 (02 Aug 2017 09:22) (99/55 - 130/68)  BP(mean): --  RR: 16 (02 Aug 2017 05:41) (16 - 18)  SpO2: 96% (02 Aug 2017 05:41) (96% - 100%)    PHYSICAL EXAM:    Constitutional: NAD, well-developed  HEENT: EOMI, throat clear  Neck: No LAD, supple  Respiratory: CTA and P  Cardiovascular: S1 and S2, RRR, no M  Gastrointestinal: BS+, soft, NT/ND, neg HSM,  Extremities: No peripheral edema, neg clubing, cyanosis  Vascular: 2+ peripheral pulses  Neurological: A/O x 3, no focal deficits  Psychiatric: Normal mood, normal affect  Skin: No rashes      LABS:                        11.8   5.43  )-----------( 211      ( 02 Aug 2017 07:30 )             35.7     08-02    145  |  103  |  20  ----------------------------<  99  4.2   |  28  |  1.22    Ca    8.8      02 Aug 2017 07:30  Phos  4.1     08-02  Mg     2.0     08-02    TPro  6.7  /  Alb  4.0  /  TBili  0.2  /  DBili  x   /  AST  18  /  ALT  15  /  AlkPhos  59  08-02    PT/INR - ( 02 Aug 2017 07:30 )   PT: 10.5 SEC;   INR: 0.94          PTT - ( 02 Aug 2017 07:30 )  PTT:28.2 SEC      RADIOLOGY & ADDITIONAL TESTS:  INTERVAL HPI/OVERNIGHT EVENTS:    MEDICATIONS  (STANDING):  aspirin enteric coated 81 milliGRAM(s) Oral daily  ranolazine 500 milliGRAM(s) Oral two times a day  clonazePAM Tablet 0.5 milliGRAM(s) Oral two times a day  PARoxetine 30 milliGRAM(s) Oral daily  pantoprazole    Tablet 40 milliGRAM(s) Oral two times a day before meals  cholecalciferol 1000 Unit(s) Oral daily  calcium carbonate 1250 mG (OsCal) 1 Tablet(s) Oral two times a day  famotidine    Tablet 20 milliGRAM(s) Oral daily  isosorbide   mononitrate ER Tablet (IMDUR) 60 milliGRAM(s) Oral daily  diltiazem    milliGRAM(s) Oral daily  buPROPion XL . 150 milliGRAM(s) Oral daily    MEDICATIONS  (PRN):      Allergies    latex (Rash)  No Known Drug Allergies  shellfish (Hives)    Intolerances        Review of Systems:    General:  No wt loss, fevers, chills, night sweats,fatigue,   Eyes:  Good vision, no reported pain  ENT:  No sore throat, pain, runny nose, dysphagia  CV:  No pain, palpitatioins, hypo/hypertension  Resp:  No dyspnea, cough, tachypnea, wheezing  GI:  No pain, No nausea, No vomiting, No diarrhea, No constipation, No weight loss, No fever, No pruritis, No rectal bleeding, No melenas, No dysphagia  :  No pain, bleeding, incontinence, nocturia  Muscle:  No pain, weakness  Neuro:  No weakness, tingling, memory problems  Psych:  No fatigue, insomnia, mood problems, depression  Endocrine:  No polyuria, polydypsia, cold/heat intolerance  Heme:  No petechiae, ecchymosis, easy bruisability  Skin:  No rash, tattoos, scars, edema      Vital Signs Last 24 Hrs  T(C): 36.7 (02 Aug 2017 05:41), Max: 36.7 (01 Aug 2017 22:18)  T(F): 98.1 (02 Aug 2017 05:41), Max: 98.1 (01 Aug 2017 22:18)  HR: 65 (02 Aug 2017 09:22) (65 - 71)  BP: 110/62 (02 Aug 2017 09:22) (99/55 - 130/68)  BP(mean): --  RR: 16 (02 Aug 2017 05:41) (16 - 18)  SpO2: 96% (02 Aug 2017 05:41) (96% - 100%)    PHYSICAL EXAM:    Constitutional: NAD, well-developed  HEENT: EOMI, throat clear  Neck: No LAD, supple  Respiratory: CTA and P  Cardiovascular: S1 and S2, RRR, no M  Gastrointestinal: BS+, soft, NT/ND, neg HSM,  Extremities: No peripheral edema, neg clubing, cyanosis  Vascular: 2+ peripheral pulses  Neurological: A/O x 3, no focal deficits  Psychiatric: Normal mood, normal affect  Skin: No rashes      LABS:                        11.8   5.43  )-----------( 211      ( 02 Aug 2017 07:30 )             35.7     08-02    145  |  103  |  20  ----------------------------<  99  4.2   |  28  |  1.22    Ca    8.8      02 Aug 2017 07:30  Phos  4.1     08-02  Mg     2.0     08-02    TPro  6.7  /  Alb  4.0  /  TBili  0.2  /  DBili  x   /  AST  18  /  ALT  15  /  AlkPhos  59  08-02    PT/INR - ( 02 Aug 2017 07:30 )   PT: 10.5 SEC;   INR: 0.94          PTT - ( 02 Aug 2017 07:30 )  PTT:28.2 SEC      RADIOLOGY & ADDITIONAL TESTS:

## 2017-08-02 NOTE — DISCHARGE NOTE ADULT - CONDITIONS AT DISCHARGE
Pt aox3 out of bed self with cane. VS stable. No chest pain noted & lungs are clear. Pt denies CP.  Dry bandage noted to R groin area.  Skin intact. IV removed.

## 2017-08-02 NOTE — DISCHARGE NOTE ADULT - CARE PLAN
Principal Discharge DX:	Chest pain  Goal:	Prevent future episodes. Your chest pain is not cardiac. You had a cardiac catheterization which showed no blockages.  Instructions for follow-up, activity and diet:	Follow up with cardiologist within one week of discharge. Call for appointment. Return to ED for any concerning symptoms. Continue medications as prescribed. Low salt, low fat, low cholesterol diet.  Secondary Diagnosis:	HTN (hypertension)  Goal:	Maintain adequate control of your blood pressure. Goal BP < 130/80. Continue low sodium diet.  Instructions for follow-up, activity and diet:	Follow up with PCP and/or cardiologist for ongoing medical management of your hypertension. Continue medications as prescribed. Low salt diet.  Secondary Diagnosis:	PUD (peptic ulcer disease)  Goal:	Continue taking your Zantac and Omeprazole.  Instructions for follow-up, activity and diet:	Follow up with your GI doctor for ongoing management of your peptic ulcer disease.

## 2017-08-02 NOTE — PROGRESS NOTE ADULT - SUBJECTIVE AND OBJECTIVE BOX
SUBJECTIVE: NO CP or SOB      MEDICATIONS  (STANDING):  aspirin enteric coated 81 milliGRAM(s) Oral daily  ranolazine 500 milliGRAM(s) Oral two times a day  clonazePAM Tablet 0.5 milliGRAM(s) Oral two times a day  PARoxetine 30 milliGRAM(s) Oral daily  pantoprazole    Tablet 40 milliGRAM(s) Oral two times a day before meals  cholecalciferol 1000 Unit(s) Oral daily  calcium carbonate 1250 mG (OsCal) 1 Tablet(s) Oral two times a day  famotidine    Tablet 20 milliGRAM(s) Oral daily  isosorbide   mononitrate ER Tablet (IMDUR) 60 milliGRAM(s) Oral daily  diltiazem    milliGRAM(s) Oral daily  buPROPion XL . 150 milliGRAM(s) Oral daily    LABS:                        11.8   5.43  )-----------( 211      ( 02 Aug 2017 07:30 )             35.7     145  |  103  |  20  ----------------------------<  99  4.2   |  28  |  1.22    Ca    8.8      02 Aug 2017 07:30  Phos  4.1     08-02  Mg     2.0     08-02    TPro  6.7  /  Alb  4.0  /  TBili  0.2  /  DBili  x   /  AST  18  /  ALT  15  /  AlkPhos  59  08-02  PT/INR - ( 02 Aug 2017 07:30 )   PT: 10.5 SEC;   INR: 0.94     PTT - ( 02 Aug 2017 07:30 )  PTT:28.2 SEC  CARDIAC MARKERS ( 01 Aug 2017 17:20 )  x     / < 0.06 ng/mL / 97 u/L / 1.71 ng/mL / x      CARDIAC MARKERS ( 01 Aug 2017 10:51 )  x     / < 0.06 ng/mL / 100 u/L / 1.46 ng/mL / x        PHYSICAL EXAM:  Vital Signs Last 24 Hrs  T(C): 36.7 (02 Aug 2017 05:41), Max: 36.7 (01 Aug 2017 22:18)  T(F): 98.1 (02 Aug 2017 05:41), Max: 98.1 (01 Aug 2017 22:18)  HR: 65 (02 Aug 2017 09:22) (65 - 71)  BP: 110/62 (02 Aug 2017 09:22) (99/55 - 136/82)  RR: 16 (02 Aug 2017 05:41) (16 - 18)  SpO2: 96% (02 Aug 2017 05:41) (96% - 100%)    HEENT: Normal Oral mucosa, PERRL, EOMI  Lymphatic: No obvious lymphadenopathy, No edema  Cardiovascular: Normal S1S2, No JVD, 1/6 IVANA, Peripheral pulses palpable 2+ B/L  Respiratory: Lungs clear to auscultation, normal effort  Gastrointestinal: Abdomen soft, ND, NT, +BS  Skin: Warm, dry, intact. No cyanosis, No rash.  Musculoskeletal: Normal ROM, normal strength  Psychiatric: Appropriate Mood and Affect      DIAGNOSTIC DATA    < from: Transthoracic Echocardiogram (06.12.17 @ 11:50) >  Conclusions:  1. Mild mitral regurgitation (severity may be  underestimated).  2. Normal left ventricular internal dimensions and wall  thicknesses.  3. Normal left ventricular systolic function. No segmental  wall motion abnormalities.  4. Mild-moderate tricuspid regurgitation.  5. Mild pulmonic regurgitation. Estimated pulmonary artery  systolic pressure equals 42  mm Hg, assuming right atrial  pressure equals 8 mm Hg, consistent with mild pulmonary  pressures.  ------------------------------------------------------------------------  Confirmed on  6/12/2017 - 14:55:54 by Pradip Clarke M.D.    < end of copied text >    < from: Nuclear Stress Test-Pharmacologic (06.16.17 @ 09:23) >  IMPRESSIONS:Mildly Abnormal Study  * Chest Pain: No chest pain with administration of  Regadenoson.  * Symptom: Shortness of breath.  * HR Response: Appropriate.  * BP Response: Appropriate.  * Heart Rhythm: Sinus Rhythm.  * Baseline ECG: Nonspecific ST-T wave abnormality in II ,  III, aVF, 0.5 mm. downsloping.  * ECG Abnormalities: No ischemic EKG changes.  * Arrhythmia: None.  * Review of raw data shows: The study is of good technical  quality.  * There is a small mild defect in apical inferolateral  wall that is reversible, suggestive of ischemia. The  defect does not resolve with prone imaging.  * Post-stress gated wall motion analysis was performed  (LVEF > 70%;LVEDV = 56 ml.) revealing normal left  ventricular size and systolic function.  ------------------------------------------------------------------------  Confirmed on  6/16/2017 - 13:26:22 by Ricardo Ragland M.D.    < end of copied text >      ASSESSMENT AND PLAN:    67 year old female PMH HTN, HLD, who was admitted to [facility] in June 2017 with syncope.  Work up revealed anemia s/p 2 untis PRBCs transfused, EGD with multiple gastric ulcers, s/p TTE with Normal LV RV function and mild MR, s/p NST with apical inferolateral ischemia who refused cardiac cath at that time, who presented to our office with ongoing chest pain, and was sent to hospital for re-eval.  OP EGD 7/29 with Dr. NATA Nice revealed gastritis/resolution of ulcers.    --Trop T negative x 2  --GI follow-up appreciated.  No CI to cardiac cath  --s/p cardiac cath revealing minor luminal irregularities  --DC plan later today if stable  --f/u Wed August 16 at 2:30PM with Dr Live as scheduled    Jennifer Ribeiro PA-C  [facility]  [address], [address]   [place] [address]  office [phone]  pager [phone]

## 2017-08-02 NOTE — DISCHARGE NOTE ADULT - ADDITIONAL INSTRUCTIONS
1. Follow up with cardiologist within one week of discharge. Call for appointment. Return to ED for any concerning symptoms. Continue medications as prescribed. Low salt, low fat, low cholesterol diet.  2. No heavy lifting x one week. No strenuous activity x 3 weeks. Monitor site of procedure and notify your doctor for any redness, swelling, discharge. No driving x 24 hours. You may shower but no baths or swimming x one week.

## 2019-07-04 NOTE — H&P ADULT - PROBLEM SELECTOR PLAN 3
not entirely clear what exactly pt means by CAD  She has never had a coronary angiogram but is on Ranexa and other cardiac meds  Will defer to cardiology spouse

## 2021-06-20 NOTE — PATIENT PROFILE ADULT. - NS TRANSFER DISPOSITION PATIENT BELONGINGS
Nursing Advice  1) no adjustment in medication is needed.  Can he continue this present regimen financially?     GFR is stable.  A1c is ideal so no adjustment in dosing is needed.  with patient

## 2022-03-01 PROBLEM — J45.909 UNSPECIFIED ASTHMA, UNCOMPLICATED: Chronic | Status: ACTIVE | Noted: 2017-08-01

## 2022-03-01 PROBLEM — F41.9 ANXIETY DISORDER, UNSPECIFIED: Chronic | Status: ACTIVE | Noted: 2017-08-01

## 2022-03-01 PROBLEM — K28.4: Chronic | Status: ACTIVE | Noted: 2017-08-01

## 2022-03-10 ENCOUNTER — APPOINTMENT (OUTPATIENT)
Dept: PLASTIC SURGERY | Facility: CLINIC | Age: 72
End: 2022-03-10
Payer: MEDICARE

## 2022-03-10 VITALS
HEIGHT: 59 IN | TEMPERATURE: 97.8 F | HEART RATE: 69 BPM | SYSTOLIC BLOOD PRESSURE: 137 MMHG | WEIGHT: 148 LBS | DIASTOLIC BLOOD PRESSURE: 81 MMHG | OXYGEN SATURATION: 96 % | BODY MASS INDEX: 29.84 KG/M2

## 2022-03-10 PROCEDURE — 99204 OFFICE O/P NEW MOD 45 MIN: CPT

## 2022-03-14 ENCOUNTER — OUTPATIENT (OUTPATIENT)
Dept: OUTPATIENT SERVICES | Facility: HOSPITAL | Age: 72
LOS: 1 days | End: 2022-03-14
Payer: MEDICARE

## 2022-03-14 ENCOUNTER — APPOINTMENT (OUTPATIENT)
Dept: ULTRASOUND IMAGING | Facility: IMAGING CENTER | Age: 72
End: 2022-03-14
Payer: MEDICARE

## 2022-03-14 DIAGNOSIS — Z98.890 OTHER SPECIFIED POSTPROCEDURAL STATES: Chronic | ICD-10-CM

## 2022-03-14 DIAGNOSIS — R22.0 LOCALIZED SWELLING, MASS AND LUMP, HEAD: ICD-10-CM

## 2022-03-14 DIAGNOSIS — Z96.659 PRESENCE OF UNSPECIFIED ARTIFICIAL KNEE JOINT: Chronic | ICD-10-CM

## 2022-03-14 DIAGNOSIS — Z00.8 ENCOUNTER FOR OTHER GENERAL EXAMINATION: ICD-10-CM

## 2022-03-14 PROCEDURE — 76536 US EXAM OF HEAD AND NECK: CPT

## 2022-03-14 PROCEDURE — 76536 US EXAM OF HEAD AND NECK: CPT | Mod: 26

## 2022-03-18 NOTE — CONSULT LETTER
[Dear  ___] : Dear  [unfilled], [Consult Letter:] : I had the pleasure of evaluating your patient, [unfilled]. [Please see my note below.] : Please see my note below. [Consult Closing:] : Thank you very much for allowing me to participate in the care of this patient.  If you have any questions, please do not hesitate to contact me. [Sincerely,] : Sincerely, [FreeTextEntry3] : Antony Banks MD, FACS

## 2022-03-21 ENCOUNTER — NON-APPOINTMENT (OUTPATIENT)
Age: 72
End: 2022-03-21

## 2022-04-14 ENCOUNTER — LABORATORY RESULT (OUTPATIENT)
Age: 72
End: 2022-04-14

## 2022-04-14 ENCOUNTER — APPOINTMENT (OUTPATIENT)
Dept: PLASTIC SURGERY | Facility: CLINIC | Age: 72
End: 2022-04-14
Payer: MEDICARE

## 2022-04-14 VITALS
OXYGEN SATURATION: 95 % | BODY MASS INDEX: 29.84 KG/M2 | TEMPERATURE: 98 F | DIASTOLIC BLOOD PRESSURE: 75 MMHG | HEIGHT: 59 IN | WEIGHT: 148 LBS | HEART RATE: 82 BPM | SYSTOLIC BLOOD PRESSURE: 153 MMHG

## 2022-04-14 DIAGNOSIS — R22.0 LOCALIZED SWELLING, MASS AND LUMP, HEAD: ICD-10-CM

## 2022-04-14 PROCEDURE — 21014 EXC FACE TUM DEEP 2 CM/>: CPT

## 2022-04-14 PROCEDURE — 13132 CMPLX RPR F/C/C/M/N/AX/G/H/F: CPT | Mod: 59

## 2022-04-18 PROBLEM — R22.0 MASS OF HEAD: Status: ACTIVE | Noted: 2022-03-10

## 2022-04-18 NOTE — PROCEDURE
[FreeTextEntry1] : Forehead mass [FreeTextEntry2] : Excision of submuscular forehead mass 3cm, complex closure 3cm [FreeTextEntry6] : After the area was prepped and draped, the area was infiltrated with 1%lidocaine with epi. The mass in question was marked and incision was made. Skin flaps were elevated, and the muscle split and mass encountered. The mass was dissected circumferentially and removed. The mass was 3 cm.  This was sent to pathology.\par \par The wound was then irrigated, and the wound was then closed in layers. The muscle was approximated with 4-0 vicryl. The dermis was then closed with 4-0 vicryl for the dermis and 4-0 monocryl for the skin. The total length was 3cm. A dressing was applied. Pt tolerated well procedure.\par  [FreeTextEntry7] : forehead mass

## 2022-04-18 NOTE — REASON FOR VISIT
[Procedure: _________] : a [unfilled] procedure visit [FreeTextEntry1] : here today for excisional biopsy and closure of her forehead mass

## 2022-04-25 ENCOUNTER — APPOINTMENT (OUTPATIENT)
Dept: PLASTIC SURGERY | Facility: CLINIC | Age: 72
End: 2022-04-25
Payer: MEDICARE

## 2022-04-25 VITALS
BODY MASS INDEX: 29.84 KG/M2 | HEIGHT: 59 IN | TEMPERATURE: 97.8 F | HEART RATE: 84 BPM | OXYGEN SATURATION: 94 % | SYSTOLIC BLOOD PRESSURE: 129 MMHG | DIASTOLIC BLOOD PRESSURE: 69 MMHG | WEIGHT: 148 LBS

## 2022-04-25 PROCEDURE — 99024 POSTOP FOLLOW-UP VISIT: CPT

## 2022-05-19 ENCOUNTER — APPOINTMENT (OUTPATIENT)
Dept: PLASTIC SURGERY | Facility: CLINIC | Age: 72
End: 2022-05-19

## 2022-05-19 VITALS
SYSTOLIC BLOOD PRESSURE: 131 MMHG | WEIGHT: 148 LBS | BODY MASS INDEX: 29.84 KG/M2 | HEART RATE: 78 BPM | DIASTOLIC BLOOD PRESSURE: 73 MMHG | HEIGHT: 59 IN | OXYGEN SATURATION: 93 % | TEMPERATURE: 98.1 F

## 2022-06-06 ENCOUNTER — APPOINTMENT (OUTPATIENT)
Dept: PLASTIC SURGERY | Facility: CLINIC | Age: 72
End: 2022-06-06
Payer: MEDICARE

## 2022-06-06 VITALS
DIASTOLIC BLOOD PRESSURE: 78 MMHG | BODY MASS INDEX: 29.84 KG/M2 | SYSTOLIC BLOOD PRESSURE: 146 MMHG | WEIGHT: 148 LBS | OXYGEN SATURATION: 95 % | HEART RATE: 77 BPM | HEIGHT: 59 IN | TEMPERATURE: 98.2 F

## 2022-06-06 DIAGNOSIS — L91.0 HYPERTROPHIC SCAR: ICD-10-CM

## 2022-06-06 DIAGNOSIS — D17.0 BENIGN LIPOMATOUS NEOPLASM OF SKIN AND SUBCUTANEOUS TISSUE OF HEAD, FACE AND NECK: ICD-10-CM

## 2022-06-06 PROCEDURE — 99024 POSTOP FOLLOW-UP VISIT: CPT

## 2023-10-27 NOTE — ED PROVIDER NOTE - NEUROLOGICAL, MLM
Call placed to patient's daughter, Theopolis Klinefelter. I informed Chacho Brarnerisjuan that, per her communication/request, I had completed a pre-authorization for the Rx of Duo-neb. I spent a good part of the morning working on this. I completed Epic pre-authorization portion, then tried \"Covermymeds. com\". Their outcome was that I should call patient's insurance, [phone]. I filled another pre-authorization (patient's ID [id number]) request and was transferred to speak with a pharmacist, Carrie Christina. She provided a case # [id number]. I was informed that this medication needs to be covered by part B. I explained that I had ordered the medication from [employer] [employer] almost 10 days ago, patient is currently wheezing, needing the medication, and [employer] company told patient's daughter that they wouldn't send the medication until Monday 10/30. Pharmacist apologized for that, but stated that our best \"option\" would be calling [employer] again and try to expedite the delivery. I provided this information to Rashida Boss for insurance lack of willing to pay for the medication. I also offered to send the prescription to any other local pharmacy of her choice. I provided information about [facility], and by my research, the medication would cost between 14 dollars at [facility] to 21 at [facility] (with the Rik information). I also informed her that we sent the prescription for 3 boxes of medication, but she can chose to buy only one of them, and wait for the delivery from [facility] next week. Chacho Sawantstephanieer stated that she will probably chose the last option. I stated that she can call back or write in case she needs additional help. Chacho Valentinejuan verbalized appreciation for the work.
Alert and oriented, no focal deficits, no motor or sensory deficits.

## 2024-05-03 ENCOUNTER — APPOINTMENT (OUTPATIENT)
Dept: UROLOGY | Facility: CLINIC | Age: 74
End: 2024-05-03
Payer: MEDICARE

## 2024-05-03 VITALS — HEART RATE: 86 BPM | SYSTOLIC BLOOD PRESSURE: 144 MMHG | DIASTOLIC BLOOD PRESSURE: 78 MMHG

## 2024-05-03 DIAGNOSIS — N81.10 CYSTOCELE, UNSPECIFIED: ICD-10-CM

## 2024-05-03 DIAGNOSIS — N28.1 CYST OF KIDNEY, ACQUIRED: ICD-10-CM

## 2024-05-03 PROCEDURE — 99204 OFFICE O/P NEW MOD 45 MIN: CPT

## 2024-05-03 NOTE — ASSESSMENT
[FreeTextEntry1] : no luts a bothe r 1- to se GYN re: vag prolapse 2- CYST - ok- patient rassured- 3- Repeat LANETTE 6m for stability and if OK - no further f/u needed

## 2024-05-03 NOTE — REVIEW OF SYSTEMS
[Eyesight Problems] : eyesight problems [Dry Eyes] : dryness of the eyes [Palpitations] : palpitations [Constipation] : constipation [see HPI] : see HPI [Joint Pain] : joint pain [Dizziness] : dizziness [Limb Weakness] : limb weakness [Difficulty Walking] : difficulty walking [Negative] : Heme/Lymph

## 2024-05-03 NOTE — PHYSICAL EXAM
[Normal Appearance] : normal appearance [Well Groomed] : well groomed [General Appearance - In No Acute Distress] : no acute distress [Edema] : no peripheral edema [Respiration, Rhythm And Depth] : normal respiratory rhythm and effort [Exaggerated Use Of Accessory Muscles For Inspiration] : no accessory muscle use [Abdomen Soft] : soft [Abdomen Tenderness] : non-tender [Costovertebral Angle Tenderness] : no ~M costovertebral angle tenderness [Normal Station and Gait] : the gait and station were normal for the patient's age [] : no rash [No Focal Deficits] : no focal deficits [Oriented To Time, Place, And Person] : oriented to person, place, and time [Affect] : the affect was normal [No Palpable Adenopathy] : no palpable adenopathy [Mood] : the mood was normal [de-identified] : pvr- 61 ml

## 2024-05-03 NOTE — HISTORY OF PRESENT ILLNESS
[FreeTextEntry1] : Patient last seen by Dr Hagen 2015 for hematuria  had CT and CYSTO at that time :negative cysto and cytology CT SCAN: KIDNEYS/URETERS: No renal stones or hydronephrosis. Symmetric renal enhancement. No renal mass or evidence of a urothelial lesion. BLADDER: Within normal limits. REPRODUCTIVE ORGANS: The uterus and adnexa are within normal limits.  now here for eval of KIDNEY CYST: ct scan ([facility] ) for weight loss and N/V ( march 14, 2024)  11mm cyst in the left lower pole , creat 0.91, urine wiht 6-10 white cells but no blood seen   hx of prolapse of bladder good flow,m no stain, rare urge INC, no pads  no hx ofhematjria or durai or utis  not sexually active  chronic constaipaton  admits to avg water intake

## 2024-10-07 ENCOUNTER — APPOINTMENT (OUTPATIENT)
Dept: GASTROENTEROLOGY | Facility: CLINIC | Age: 74
End: 2024-10-07
Payer: MEDICARE

## 2024-10-07 DIAGNOSIS — K86.2 CYST OF PANCREAS: ICD-10-CM

## 2024-10-07 PROCEDURE — G2211 COMPLEX E/M VISIT ADD ON: CPT

## 2024-10-07 PROCEDURE — 99203 OFFICE O/P NEW LOW 30 MIN: CPT

## 2024-10-07 RX ORDER — BUPROPION HYDROCHLORIDE 300 MG/1
300 TABLET, EXTENDED RELEASE ORAL
Refills: 0 | Status: ACTIVE | COMMUNITY

## 2024-10-07 RX ORDER — ROSUVASTATIN CALCIUM 5 MG/1
5 TABLET, FILM COATED ORAL
Refills: 0 | Status: ACTIVE | COMMUNITY

## 2024-10-07 RX ORDER — AMLODIPINE BESYLATE 5 MG/1
5 TABLET ORAL
Refills: 0 | Status: ACTIVE | COMMUNITY

## 2024-10-07 RX ORDER — CARVEDILOL 25 MG/1
25 TABLET, FILM COATED ORAL
Refills: 0 | Status: ACTIVE | COMMUNITY

## 2024-10-07 RX ORDER — ADHESIVE TAPE 3"X 2.3 YD
50 MCG TAPE, NON-MEDICATED TOPICAL
Refills: 0 | Status: ACTIVE | COMMUNITY

## 2025-01-07 NOTE — ASU PATIENT PROFILE, ADULT - NSICDXPASTMEDICALHX_GEN_ALL_CORE_FT
PAST MEDICAL HISTORY:  Anxiety and depression     Asthma last attack 1 year ago    CAD (coronary artery disease)     Gastrojejunal ulcer with hemorrhage     Hypertension

## 2025-01-07 NOTE — ASU PATIENT PROFILE, ADULT - FALL HARM RISK - UNIVERSAL INTERVENTIONS
Bed in lowest position, wheels locked, appropriate side rails in place/Call bell, personal items and telephone in reach/Instruct patient to call for assistance before getting out of bed or chair/Non-slip footwear when patient is out of bed/[place] to call system/Physically safe environment - no spills, clutter or unnecessary equipment/Purposeful Proactive Rounding/Room/bathroom lighting operational, light cord in reach

## 2025-01-08 ENCOUNTER — RESULT REVIEW (OUTPATIENT)
Age: 75
End: 2025-01-08

## 2025-01-08 ENCOUNTER — TRANSCRIPTION ENCOUNTER (OUTPATIENT)
Age: 75
End: 2025-01-08

## 2025-01-08 ENCOUNTER — OUTPATIENT (OUTPATIENT)
Dept: OUTPATIENT SERVICES | Facility: HOSPITAL | Age: 75
LOS: 1 days | Discharge: HOME | End: 2025-01-08
Payer: MEDICARE

## 2025-01-08 ENCOUNTER — APPOINTMENT (OUTPATIENT)
Dept: GASTROENTEROLOGY | Facility: HOSPITAL | Age: 75
End: 2025-01-08

## 2025-01-08 VITALS
HEART RATE: 80 BPM | RESPIRATION RATE: 17 BRPM | OXYGEN SATURATION: 100 % | DIASTOLIC BLOOD PRESSURE: 88 MMHG | SYSTOLIC BLOOD PRESSURE: 111 MMHG

## 2025-01-08 VITALS
DIASTOLIC BLOOD PRESSURE: 68 MMHG | HEART RATE: 78 BPM | HEIGHT: 59 IN | WEIGHT: 121.92 LBS | SYSTOLIC BLOOD PRESSURE: 143 MMHG | OXYGEN SATURATION: 99 % | TEMPERATURE: 98 F | RESPIRATION RATE: 22 BRPM

## 2025-01-08 DIAGNOSIS — Z98.890 OTHER SPECIFIED POSTPROCEDURAL STATES: Chronic | ICD-10-CM

## 2025-01-08 DIAGNOSIS — Z96.659 PRESENCE OF UNSPECIFIED ARTIFICIAL KNEE JOINT: Chronic | ICD-10-CM

## 2025-01-08 DIAGNOSIS — K25.4 CHRONIC OR UNSPECIFIED GASTRIC ULCER WITH HEMORRHAGE: ICD-10-CM

## 2025-01-08 DIAGNOSIS — K86.2 CYST OF PANCREAS: ICD-10-CM

## 2025-01-08 PROCEDURE — 43237 ENDOSCOPIC US EXAM ESOPH: CPT | Mod: GC

## 2025-01-08 PROCEDURE — 88305 TISSUE EXAM BY PATHOLOGIST: CPT | Mod: 26

## 2025-01-08 PROCEDURE — 43239 EGD BIOPSY SINGLE/MULTIPLE: CPT | Mod: GC

## 2025-01-08 RX ORDER — OMEPRAZOLE 40 MG/1
40 CAPSULE, DELAYED RELEASE ORAL TWICE DAILY
Qty: 180 | Refills: 1 | Status: ACTIVE | COMMUNITY
Start: 2025-01-08 | End: 1900-01-01

## 2025-01-08 NOTE — PRE PROCEDURE NOTE - PRE PROCEDURE EVALUATION
Attending Physician:  Angel                  Procedure: eus    Indication for Procedure: pancreatic cysts  ________________________________________________________  PAST MEDICAL & SURGICAL HISTORY:  Hypertension      CAD (coronary artery disease)      Anxiety and depression      Gastrojejunal ulcer with hemorrhage      Asthma  last attack 1 year ago      S/P TKR (total knee replacement)      H/O laminectomy  cervical c3-c7        ALLERGIES:  No Known Drug Allergies  latex (Rash)  shellfish (Hives)    HOME MEDICATIONS:  aspirin 81 mg oral tablet: 1 tab(s) orally once a day  calcium carbonate: 1 tab(s) orally once a day  clonazePAM 0.5 mg oral tablet: 1 tab(s) orally 2 times a day  DilTIAZem Hydrochloride  mg/24 hours oral capsule, extended release: 1 cap(s) orally once a day (in the morning)  Metoprolol Succinate ER 25 mg oral tablet, extended release: 1 tab(s) orally once a day  multivitamin: 1 tab(s) orally once a day  omeprazole 40 mg oral delayed release capsule: 1 cap(s) orally once a day  PARoxetine 30 mg oral tablet: 1 tab(s) orally once a day (in the evening)  raNITIdine 150 mg oral capsule: 1 cap(s) orally once a day (at bedtime)  Vitamin D3 1000 intl units oral tablet: 1 tab(s) orally once a day (in the evening)  Wellbutrin  mg/12 hours oral tablet, extended release: 1 tab(s) orally once a day (in the morning)    AICD/PPM: [ ] yes   [ x] no    PERTINENT LAB DATA:                      PHYSICAL EXAMINATION:    T(C): --  HR: --  BP: --  RR: --  SpO2: --    Constitutional: NAD  Neck:  supple  Respiratory: no respiratory stress, no audible wheezes  Cardiovascular: RR  Gastrointestinal: soft, NT/ND  Extremities: No peripheral edema  Neurological: Alert, no focal deficits  Psychiatric: Normal mood, normal affect  Skin: No rashes      COMMENTS:    The patient is a suitable candidate for the planned procedure unless box checked [ ]  No, explain:

## 2025-01-08 NOTE — ASU DISCHARGE PLAN (ADULT/PEDIATRIC) - FINANCIAL ASSISTANCE
[facility] provides services at a reduced cost to those who are determined to be eligible through [facility]’s financial assistance program. Information regarding [facility]’s financial assistance program can be found by going to [email or web address] or by calling [phone].

## 2025-01-10 LAB — SURGICAL PATHOLOGY STUDY: SIGNIFICANT CHANGE UP

## 2025-01-11 ENCOUNTER — NON-APPOINTMENT (OUTPATIENT)
Age: 75
End: 2025-01-11

## 2025-02-05 NOTE — PATIENT PROFILE ADULT. - PRO PAIN EXPRESSION
Pt notified by preforming nurse in person     Exercise stress test   IMPRESSION:  This is a nondiagnostic treadmill stress test as target heart rate is not achieved.     none

## (undated) DEVICE — DENTURE CUP PINK

## (undated) DEVICE — CONTAINER FORMALIN 10% 20ML

## (undated) DEVICE — DRSG 2X2

## (undated) DEVICE — CLAMP BX HOT RAD JAW 3

## (undated) DEVICE — SALIVA EJECTOR (BLUE)

## (undated) DEVICE — GOWN LG

## (undated) DEVICE — BASIN EMESIS 10IN GRADUATED MAUVE

## (undated) DEVICE — LUBRICATING JELLY HR ONE SHOT 3G

## (undated) DEVICE — DRSG BANDAID 0.75X3"

## (undated) DEVICE — DRSG CURITY GAUZE SPONGE 4 X 4" 12-PLY NON-STERILE

## (undated) DEVICE — TUBING IV SET GRAVITY 3Y 100" MACRO

## (undated) DEVICE — TUBING MEDI-VAC W MAXIGRIP CONNECTORS 1/4"X6'

## (undated) DEVICE — ELCTR ECG CONDUCTIVE ADHESIVE

## (undated) DEVICE — CATH IV SAFE BC 22G X 1" (BLUE)

## (undated) DEVICE — PACK IV START WITH CHG

## (undated) DEVICE — UNDERPAD LINEN SAVER 17 X 24"

## (undated) DEVICE — BIOPSY FORCEP COLD DISP

## (undated) DEVICE — BITE BLOCK ADULT 20 X 27MM (GREEN)

## (undated) DEVICE — BIOPSY FORCEP RADIAL JAW 4 STANDARD WITH NEEDLE

## (undated) DEVICE — CONTAINER FORMALIN 80ML YELLOW